# Patient Record
Sex: FEMALE | Race: WHITE | NOT HISPANIC OR LATINO
[De-identification: names, ages, dates, MRNs, and addresses within clinical notes are randomized per-mention and may not be internally consistent; named-entity substitution may affect disease eponyms.]

---

## 2018-05-01 ENCOUNTER — APPOINTMENT (OUTPATIENT)
Dept: MAMMOGRAPHY | Facility: CLINIC | Age: 53
End: 2018-05-01
Payer: COMMERCIAL

## 2018-05-01 ENCOUNTER — APPOINTMENT (OUTPATIENT)
Dept: ULTRASOUND IMAGING | Facility: CLINIC | Age: 53
End: 2018-05-01
Payer: COMMERCIAL

## 2018-05-01 ENCOUNTER — OUTPATIENT (OUTPATIENT)
Dept: OUTPATIENT SERVICES | Facility: HOSPITAL | Age: 53
LOS: 1 days | End: 2018-05-01
Payer: COMMERCIAL

## 2018-05-01 DIAGNOSIS — Z12.31 ENCOUNTER FOR SCREENING MAMMOGRAM FOR MALIGNANT NEOPLASM OF BREAST: ICD-10-CM

## 2018-05-01 PROCEDURE — 77067 SCR MAMMO BI INCL CAD: CPT | Mod: 26

## 2018-05-01 PROCEDURE — 76641 ULTRASOUND BREAST COMPLETE: CPT | Mod: 26,50

## 2018-05-01 PROCEDURE — 77067 SCR MAMMO BI INCL CAD: CPT

## 2018-05-01 PROCEDURE — 77063 BREAST TOMOSYNTHESIS BI: CPT | Mod: 26

## 2018-05-01 PROCEDURE — 77063 BREAST TOMOSYNTHESIS BI: CPT

## 2018-05-01 PROCEDURE — 76641 ULTRASOUND BREAST COMPLETE: CPT

## 2021-12-24 ENCOUNTER — TRANSCRIPTION ENCOUNTER (OUTPATIENT)
Age: 56
End: 2021-12-24

## 2022-01-05 ENCOUNTER — APPOINTMENT (OUTPATIENT)
Dept: VASCULAR SURGERY | Facility: CLINIC | Age: 57
End: 2022-01-05

## 2022-04-22 ENCOUNTER — INPATIENT (INPATIENT)
Facility: HOSPITAL | Age: 57
LOS: 5 days | Discharge: NURSING, REHAB OR LONG-TERM CARE | DRG: 488 | End: 2022-04-28
Attending: STUDENT IN AN ORGANIZED HEALTH CARE EDUCATION/TRAINING PROGRAM | Admitting: STUDENT IN AN ORGANIZED HEALTH CARE EDUCATION/TRAINING PROGRAM
Payer: COMMERCIAL

## 2022-04-22 VITALS
WEIGHT: 199.96 LBS | DIASTOLIC BLOOD PRESSURE: 99 MMHG | RESPIRATION RATE: 22 BRPM | HEART RATE: 98 BPM | TEMPERATURE: 99 F | SYSTOLIC BLOOD PRESSURE: 175 MMHG | OXYGEN SATURATION: 99 %

## 2022-04-22 DIAGNOSIS — S82.209A UNSPECIFIED FRACTURE OF SHAFT OF UNSPECIFIED TIBIA, INITIAL ENCOUNTER FOR CLOSED FRACTURE: ICD-10-CM

## 2022-04-22 DIAGNOSIS — S82.101A UNSPECIFIED FRACTURE OF UPPER END OF RIGHT TIBIA, INITIAL ENCOUNTER FOR CLOSED FRACTURE: ICD-10-CM

## 2022-04-22 LAB
ABO RH CONFIRMATION: SIGNIFICANT CHANGE UP
ALBUMIN SERPL ELPH-MCNC: 4.3 G/DL — SIGNIFICANT CHANGE UP (ref 3.3–5.2)
ALP SERPL-CCNC: 109 U/L — SIGNIFICANT CHANGE UP (ref 40–120)
ALT FLD-CCNC: 42 U/L — HIGH
ANION GAP SERPL CALC-SCNC: 17 MMOL/L — SIGNIFICANT CHANGE UP (ref 5–17)
APTT BLD: 24.8 SEC — LOW (ref 27.5–35.5)
AST SERPL-CCNC: 34 U/L — HIGH
BASOPHILS # BLD AUTO: 0.07 K/UL — SIGNIFICANT CHANGE UP (ref 0–0.2)
BASOPHILS NFR BLD AUTO: 0.5 % — SIGNIFICANT CHANGE UP (ref 0–2)
BILIRUB SERPL-MCNC: 0.3 MG/DL — LOW (ref 0.4–2)
BLD GP AB SCN SERPL QL: SIGNIFICANT CHANGE UP
BUN SERPL-MCNC: 23.3 MG/DL — HIGH (ref 8–20)
CALCIUM SERPL-MCNC: 9.4 MG/DL — SIGNIFICANT CHANGE UP (ref 8.6–10.2)
CHLORIDE SERPL-SCNC: 102 MMOL/L — SIGNIFICANT CHANGE UP (ref 98–107)
CO2 SERPL-SCNC: 21 MMOL/L — LOW (ref 22–29)
CREAT SERPL-MCNC: 0.83 MG/DL — SIGNIFICANT CHANGE UP (ref 0.5–1.3)
EGFR: 82 ML/MIN/1.73M2 — SIGNIFICANT CHANGE UP
EOSINOPHIL # BLD AUTO: 0.21 K/UL — SIGNIFICANT CHANGE UP (ref 0–0.5)
EOSINOPHIL NFR BLD AUTO: 1.6 % — SIGNIFICANT CHANGE UP (ref 0–6)
GLUCOSE SERPL-MCNC: 132 MG/DL — HIGH (ref 70–99)
HCT VFR BLD CALC: 43.8 % — SIGNIFICANT CHANGE UP (ref 34.5–45)
HGB BLD-MCNC: 14.4 G/DL — SIGNIFICANT CHANGE UP (ref 11.5–15.5)
IMM GRANULOCYTES NFR BLD AUTO: 0.4 % — SIGNIFICANT CHANGE UP (ref 0–1.5)
INR BLD: 0.97 RATIO — SIGNIFICANT CHANGE UP (ref 0.88–1.16)
LYMPHOCYTES # BLD AUTO: 18.7 % — SIGNIFICANT CHANGE UP (ref 13–44)
LYMPHOCYTES # BLD AUTO: 2.42 K/UL — SIGNIFICANT CHANGE UP (ref 1–3.3)
MCHC RBC-ENTMCNC: 29 PG — SIGNIFICANT CHANGE UP (ref 27–34)
MCHC RBC-ENTMCNC: 32.9 GM/DL — SIGNIFICANT CHANGE UP (ref 32–36)
MCV RBC AUTO: 88.3 FL — SIGNIFICANT CHANGE UP (ref 80–100)
MONOCYTES # BLD AUTO: 0.56 K/UL — SIGNIFICANT CHANGE UP (ref 0–0.9)
MONOCYTES NFR BLD AUTO: 4.3 % — SIGNIFICANT CHANGE UP (ref 2–14)
NEUTROPHILS # BLD AUTO: 9.66 K/UL — HIGH (ref 1.8–7.4)
NEUTROPHILS NFR BLD AUTO: 74.5 % — SIGNIFICANT CHANGE UP (ref 43–77)
PLATELET # BLD AUTO: 347 K/UL — SIGNIFICANT CHANGE UP (ref 150–400)
POTASSIUM SERPL-MCNC: 3.8 MMOL/L — SIGNIFICANT CHANGE UP (ref 3.5–5.3)
POTASSIUM SERPL-SCNC: 3.8 MMOL/L — SIGNIFICANT CHANGE UP (ref 3.5–5.3)
PROT SERPL-MCNC: 7.7 G/DL — SIGNIFICANT CHANGE UP (ref 6.6–8.7)
PROTHROM AB SERPL-ACNC: 11.2 SEC — SIGNIFICANT CHANGE UP (ref 10.5–13.4)
RAPID RVP RESULT: SIGNIFICANT CHANGE UP
RBC # BLD: 4.96 M/UL — SIGNIFICANT CHANGE UP (ref 3.8–5.2)
RBC # FLD: 13.2 % — SIGNIFICANT CHANGE UP (ref 10.3–14.5)
SARS-COV-2 RNA SPEC QL NAA+PROBE: SIGNIFICANT CHANGE UP
SODIUM SERPL-SCNC: 140 MMOL/L — SIGNIFICANT CHANGE UP (ref 135–145)
WBC # BLD: 12.97 K/UL — HIGH (ref 3.8–10.5)
WBC # FLD AUTO: 12.97 K/UL — HIGH (ref 3.8–10.5)

## 2022-04-22 PROCEDURE — 99223 1ST HOSP IP/OBS HIGH 75: CPT | Mod: 57

## 2022-04-22 PROCEDURE — 73552 X-RAY EXAM OF FEMUR 2/>: CPT | Mod: 26,RT

## 2022-04-22 PROCEDURE — 99285 EMERGENCY DEPT VISIT HI MDM: CPT

## 2022-04-22 PROCEDURE — 73700 CT LOWER EXTREMITY W/O DYE: CPT | Mod: 26,RT

## 2022-04-22 PROCEDURE — 99254 IP/OBS CNSLTJ NEW/EST MOD 60: CPT

## 2022-04-22 PROCEDURE — 71045 X-RAY EXAM CHEST 1 VIEW: CPT | Mod: 26

## 2022-04-22 PROCEDURE — 93010 ELECTROCARDIOGRAM REPORT: CPT

## 2022-04-22 PROCEDURE — 73590 X-RAY EXAM OF LOWER LEG: CPT | Mod: 26,RT

## 2022-04-22 RX ORDER — HYDROMORPHONE HYDROCHLORIDE 2 MG/ML
1 INJECTION INTRAMUSCULAR; INTRAVENOUS; SUBCUTANEOUS ONCE
Refills: 0 | Status: DISCONTINUED | OUTPATIENT
Start: 2022-04-22 | End: 2022-04-22

## 2022-04-22 RX ORDER — KETOROLAC TROMETHAMINE 30 MG/ML
30 SYRINGE (ML) INJECTION ONCE
Refills: 0 | Status: DISCONTINUED | OUTPATIENT
Start: 2022-04-22 | End: 2022-04-22

## 2022-04-22 RX ORDER — ACETAMINOPHEN 500 MG
1000 TABLET ORAL ONCE
Refills: 0 | Status: COMPLETED | OUTPATIENT
Start: 2022-04-22 | End: 2022-04-22

## 2022-04-22 RX ORDER — MUPIROCIN 20 MG/G
1 OINTMENT TOPICAL
Refills: 0 | Status: DISCONTINUED | OUTPATIENT
Start: 2022-04-22 | End: 2022-04-25

## 2022-04-22 RX ORDER — ONDANSETRON 8 MG/1
4 TABLET, FILM COATED ORAL ONCE
Refills: 0 | Status: COMPLETED | OUTPATIENT
Start: 2022-04-22 | End: 2022-04-22

## 2022-04-22 RX ORDER — OXYCODONE HYDROCHLORIDE 5 MG/1
5 TABLET ORAL
Refills: 0 | Status: DISCONTINUED | OUTPATIENT
Start: 2022-04-22 | End: 2022-04-25

## 2022-04-22 RX ORDER — SODIUM CHLORIDE 9 MG/ML
500 INJECTION INTRAMUSCULAR; INTRAVENOUS; SUBCUTANEOUS ONCE
Refills: 0 | Status: COMPLETED | OUTPATIENT
Start: 2022-04-22 | End: 2022-04-22

## 2022-04-22 RX ORDER — ATORVASTATIN CALCIUM 80 MG/1
20 TABLET, FILM COATED ORAL AT BEDTIME
Refills: 0 | Status: DISCONTINUED | OUTPATIENT
Start: 2022-04-22 | End: 2022-04-25

## 2022-04-22 RX ORDER — ONDANSETRON 8 MG/1
4 TABLET, FILM COATED ORAL EVERY 4 HOURS
Refills: 0 | Status: DISCONTINUED | OUTPATIENT
Start: 2022-04-22 | End: 2022-04-25

## 2022-04-22 RX ORDER — HYDROMORPHONE HYDROCHLORIDE 2 MG/ML
0.5 INJECTION INTRAMUSCULAR; INTRAVENOUS; SUBCUTANEOUS EVERY 4 HOURS
Refills: 0 | Status: DISCONTINUED | OUTPATIENT
Start: 2022-04-22 | End: 2022-04-25

## 2022-04-22 RX ORDER — ENOXAPARIN SODIUM 100 MG/ML
40 INJECTION SUBCUTANEOUS ONCE
Refills: 0 | Status: COMPLETED | OUTPATIENT
Start: 2022-04-22 | End: 2022-04-22

## 2022-04-22 RX ORDER — OXYCODONE HYDROCHLORIDE 5 MG/1
10 TABLET ORAL
Refills: 0 | Status: DISCONTINUED | OUTPATIENT
Start: 2022-04-22 | End: 2022-04-25

## 2022-04-22 RX ORDER — POVIDONE-IODINE 5 %
1 AEROSOL (ML) TOPICAL ONCE
Refills: 0 | Status: COMPLETED | OUTPATIENT
Start: 2022-04-22 | End: 2022-04-25

## 2022-04-22 RX ORDER — PANTOPRAZOLE SODIUM 20 MG/1
40 TABLET, DELAYED RELEASE ORAL
Refills: 0 | Status: DISCONTINUED | OUTPATIENT
Start: 2022-04-22 | End: 2022-04-25

## 2022-04-22 RX ORDER — FLUTICASONE PROPIONATE 50 MCG
1 SPRAY, SUSPENSION NASAL
Refills: 0 | Status: DISCONTINUED | OUTPATIENT
Start: 2022-04-22 | End: 2022-04-25

## 2022-04-22 RX ORDER — HYDROMORPHONE HYDROCHLORIDE 2 MG/ML
0.5 INJECTION INTRAMUSCULAR; INTRAVENOUS; SUBCUTANEOUS ONCE
Refills: 0 | Status: DISCONTINUED | OUTPATIENT
Start: 2022-04-22 | End: 2022-04-22

## 2022-04-22 RX ADMIN — Medication 1000 MILLIGRAM(S): at 11:55

## 2022-04-22 RX ADMIN — HYDROMORPHONE HYDROCHLORIDE 0.5 MILLIGRAM(S): 2 INJECTION INTRAMUSCULAR; INTRAVENOUS; SUBCUTANEOUS at 10:26

## 2022-04-22 RX ADMIN — Medication 400 MILLIGRAM(S): at 10:27

## 2022-04-22 RX ADMIN — HYDROMORPHONE HYDROCHLORIDE 0.5 MILLIGRAM(S): 2 INJECTION INTRAMUSCULAR; INTRAVENOUS; SUBCUTANEOUS at 21:22

## 2022-04-22 RX ADMIN — OXYCODONE HYDROCHLORIDE 10 MILLIGRAM(S): 5 TABLET ORAL at 22:39

## 2022-04-22 RX ADMIN — OXYCODONE HYDROCHLORIDE 10 MILLIGRAM(S): 5 TABLET ORAL at 20:30

## 2022-04-22 RX ADMIN — HYDROMORPHONE HYDROCHLORIDE 1 MILLIGRAM(S): 2 INJECTION INTRAMUSCULAR; INTRAVENOUS; SUBCUTANEOUS at 10:27

## 2022-04-22 RX ADMIN — ATORVASTATIN CALCIUM 20 MILLIGRAM(S): 80 TABLET, FILM COATED ORAL at 21:08

## 2022-04-22 RX ADMIN — ENOXAPARIN SODIUM 40 MILLIGRAM(S): 100 INJECTION SUBCUTANEOUS at 15:30

## 2022-04-22 RX ADMIN — HYDROMORPHONE HYDROCHLORIDE 0.5 MILLIGRAM(S): 2 INJECTION INTRAMUSCULAR; INTRAVENOUS; SUBCUTANEOUS at 17:00

## 2022-04-22 RX ADMIN — ONDANSETRON 4 MILLIGRAM(S): 8 TABLET, FILM COATED ORAL at 10:13

## 2022-04-22 RX ADMIN — HYDROMORPHONE HYDROCHLORIDE 0.5 MILLIGRAM(S): 2 INJECTION INTRAMUSCULAR; INTRAVENOUS; SUBCUTANEOUS at 12:10

## 2022-04-22 RX ADMIN — HYDROMORPHONE HYDROCHLORIDE 0.5 MILLIGRAM(S): 2 INJECTION INTRAMUSCULAR; INTRAVENOUS; SUBCUTANEOUS at 21:07

## 2022-04-22 RX ADMIN — HYDROMORPHONE HYDROCHLORIDE 0.5 MILLIGRAM(S): 2 INJECTION INTRAMUSCULAR; INTRAVENOUS; SUBCUTANEOUS at 16:14

## 2022-04-22 RX ADMIN — OXYCODONE HYDROCHLORIDE 10 MILLIGRAM(S): 5 TABLET ORAL at 19:33

## 2022-04-22 RX ADMIN — HYDROMORPHONE HYDROCHLORIDE 0.5 MILLIGRAM(S): 2 INJECTION INTRAMUSCULAR; INTRAVENOUS; SUBCUTANEOUS at 10:13

## 2022-04-22 RX ADMIN — OXYCODONE HYDROCHLORIDE 10 MILLIGRAM(S): 5 TABLET ORAL at 23:17

## 2022-04-22 RX ADMIN — HYDROMORPHONE HYDROCHLORIDE 1 MILLIGRAM(S): 2 INJECTION INTRAMUSCULAR; INTRAVENOUS; SUBCUTANEOUS at 11:55

## 2022-04-22 RX ADMIN — HYDROMORPHONE HYDROCHLORIDE 0.5 MILLIGRAM(S): 2 INJECTION INTRAMUSCULAR; INTRAVENOUS; SUBCUTANEOUS at 12:23

## 2022-04-22 RX ADMIN — SODIUM CHLORIDE 1000 MILLILITER(S): 9 INJECTION INTRAMUSCULAR; INTRAVENOUS; SUBCUTANEOUS at 15:30

## 2022-04-22 RX ADMIN — MUPIROCIN 1 APPLICATION(S): 20 OINTMENT TOPICAL at 16:07

## 2022-04-22 RX ADMIN — Medication 30 MILLIGRAM(S): at 10:27

## 2022-04-22 RX ADMIN — ONDANSETRON 4 MILLIGRAM(S): 8 TABLET, FILM COATED ORAL at 16:15

## 2022-04-22 RX ADMIN — Medication 30 MILLIGRAM(S): at 11:55

## 2022-04-22 NOTE — CONSULT NOTE ADULT - SUBJECTIVE AND OBJECTIVE BOX
Patient is a 56y old  Female who presented to ED with RLE pain after mechanical fall , found to have R proximal tibia  fracture , planned for repair . Medicine consulted for preop clearance .   Patient seen and examined in ED , pain better controlled ,feels nauseated after pain medications ,   no sob , no chest pain  no other complaints     CC: R knee pain , s/p mechanical fall     Patient is a 56y Female with PMH of HLD, Acid Reflux , history of obesity , s/p Lap band 2009 ,   complicated with aspiration PNA and lung abscess in 2020 , was hospitalized and treated with iv Abx x 6 wks ( s/p PICC line placement ) , follows regularly with Pulmonologist, hx of small lungs nodules  . Last CT chest done 3/30 /22 - read as new small reticular infiltrates in the R middle lobe, minimally enlarged R lung nodules as well new small nodules < 5 mm. Patient presented presenting to the emergency department with a chief complaint of RLE pain x today, s/p mechanical fall. Patient states she tripped and fell off her porch this AM. Denies numbness/tingling. No LOC , admits to chronic sob  on exertion but no chest pain .                             14.4   12.97 )-----------( 347      ( 22 Apr 2022 10:22 )             43.8     04-22    140  |  102  |  23.3<H>  ----------------------------<  132<H>  3.8   |  21.0<L>  |  0.83    Ca    9.4      22 Apr 2022 10:22    TPro  7.7  /  Alb  4.3  /  TBili  0.3<L>  /  DBili  x   /  AST  34<H>  /  ALT  42<H>  /  AlkPhos  109  04-22    Respiratory Viral Panel with COVID-19 by SELINA (04.22.22 @ 10:22)    Rapid RVP Result: NotDetec      PAST MEDICAL & SURGICAL HISTORY:  HLD   Acid Reflux  Hx of obesity , s/p lap band ( 2009 )   Aspiration PNA / lung abscess -   s/p PICC line and iv ABX X 6wks in 2020 ,   hx of small lung nodules < 5 mm      Social History:  Tobacco - ex smoker , quit 2003   ETOH - denies   Illicit drug abuse - denies    FAMILY HISTORY:  Brother with CAD/ CABG   Mother with CHF  Denies CA       Allergies    No Known Allergies    Intolerances    HOME MEDICATIONS :     Atorvastatin 20 mg QHS  Pantoprazole     REVIEW OF SYSTEMS:    R knee pain , nausea , s/p mechanical fall ,   chronic sob post aspiration PNA / abscess on exertion ,   all other systems are reviewed and are negative     MEDICATIONS  (STANDING):  atorvastatin 20 milliGRAM(s) Oral at bedtime  fluticasone propionate 50 MICROgram(s)/spray Nasal Spray 1 Spray(s) Both Nostrils two times a day  mupirocin 2% Ointment 1 Application(s) Both Nostrils two times a day  pantoprazole    Tablet 40 milliGRAM(s) Oral before breakfast  povidone iodine 5% Nasal Swab 1 Application(s) Both Nostrils once    MEDICATIONS  (PRN):  HYDROmorphone  Injectable 0.5 milliGRAM(s) IV Push every 4 hours PRN breakthrough pain  oxyCODONE    IR 5 milliGRAM(s) Oral every 3 hours PRN Mild Pain (1 - 3)  oxyCODONE    IR 10 milliGRAM(s) Oral every 3 hours PRN Moderate Pain (4 - 6)      Vital Signs Last 24 Hrs  T(C): 36.6 (22 Apr 2022 10:36), Max: 37.1 (22 Apr 2022 09:33)  T(F): 97.9 (22 Apr 2022 10:36), Max: 98.8 (22 Apr 2022 09:33)  HR: 64 (22 Apr 2022 10:36) (64 - 98)  BP: 117/79 (22 Apr 2022 10:36) (117/79 - 175/99)  BP(mean): --  RR: 22 (22 Apr 2022 10:36) (22 - 22)  SpO2: 98% (22 Apr 2022 10:36) (98% - 99%)    PHYSICAL EXAM:    GENERAL: NAD, well-groomed, well-developed  HEAD:  Atraumatic, Normocephalic  EYES: EOMI, PERRLA, conjunctiva and sclera clear  NECK: Supple, No JVD, Normal thyroid  NERVOUS SYSTEM:  Alert & Oriented X4 , no focal deficit   CHEST/LUNG: CTA  b/l,  no rales, rhonchi, wheezing, or rubs  HEART: Regular rate and rhythm; No murmurs, rubs, or gallops  ABDOMEN: Soft, Nontender, Nondistended; Bowel sounds present  EXTREMITIES:  2+ Peripheral Pulses, No clubbing, cyanosis, or edema ,   R knee dressing + , tenderness +   LYMPH: No lymphadenopathy noted  SKIN: No rashes or lesions    LABS:                        14.4   12.97 )-----------( 347      ( 22 Apr 2022 10:22 )             43.8     04-22    140  |  102  |  23.3<H>  ----------------------------<  132<H>  3.8   |  21.0<L>  |  0.83    Ca    9.4      22 Apr 2022 10:22    TPro  7.7  /  Alb  4.3  /  TBili  0.3<L>  /  DBili  x   /  AST  34<H>  /  ALT  42<H>  /  AlkPhos  109  04-22    PT/INR - ( 22 Apr 2022 10:22 )   PT: 11.2 sec;   INR: 0.97 ratio         PTT - ( 22 Apr 2022 10:22 )  PTT:24.8 sec        RADIOLOGY & ADDITIONAL STUDIES:    < from: Xray Chest 1 View- PORTABLE-Urgent (Xray Chest 1 View- PORTABLE-Urgent .) (04.22.22 @ 10:50) >    ACC: [id number] EXAM:  XR TIB FIB AP LAT 2 VIEWS RT                        ACC: [id number] EXAM:  XR CHEST PORTABLE URGENT 1V                          PROCEDURE DATE:  04/22/2022          INTERPRETATION:  Chest and right tib-fib. Patient fell with local trauma.    AP chest on April 22, 2022 at 10:32 AM.    COMPARISON: None available.    Heart size is within normal limits.    Lungs are clear.    No chest fracture.    Right tib-fib. AP and lateral views.    There is a comminuted fracture the upper shaft that extends into the   lateral tibial plateau where there is a mild depression.    There is a knee effusion. The knee is a baseline is relatively free of   degeneration.    IMPRESSION: Comminuted upper tibial fracture extending to the lateral   tibial plateau with mild depression.    --- End of Report ---    < end of copied text >    < from: Xray Femur 2 Views, Right (04.22.22 @ 10:52) >    ACC: [id number] EXAM:  XR FEMUR 2 VIEWS RT                          PROCEDURE DATE:  04/22/2022          INTERPRETATION:  Right femur. 2 views. Patient fell with local trauma.    Extensive the comminuted upper shaft fracture of the tibia with extension   to the lateral tibial plateau again noted.    The femur proper is unremarkable in the right hip is unremarkable.    IMPRESSION: As above.    --- End of Report ---    < end of copied text >      EKG ( 4/22/22 ) - NSR - bradycardia at 54 bpm

## 2022-04-22 NOTE — ED PROVIDER NOTE - MUSCULOSKELETAL, MLM
Spine appears normal, range of motion is not limited. No C/T/L spine TTP. + TTP of the right proximal tibia TTP. No TTP of the right hip/femur/ankle/foot noted. No signs of trauma or TTP with FROM of all other extremities.

## 2022-04-22 NOTE — ED ADULT NURSE NOTE - OBJECTIVE STATEMENT
pt arrives to ED after a fall off of her back porch having her leg twist the other way. Pt is severe pain and leg is immobilized with a splint. IV placed, labs sent, meds given, XRay being completed.

## 2022-04-22 NOTE — ED PROVIDER NOTE - NS ED ATTENDING STATEMENT MOD
This was a shared visit with the AUGUST. I reviewed and verified the documentation and independently performed the documented:

## 2022-04-22 NOTE — H&P ADULT - PROBLEM SELECTOR PLAN 1
D/W Dr. Bowers  f/u CT  plan for OR this weekend vs Monday pending swelling  strict elevation  compartment checks q4h D/W Dr. Bowers  f/u CT  well padded KI placed  plan for OR this weekend vs Monday pending swelling  strict elevation  compartment checks q4h

## 2022-04-22 NOTE — PATIENT PROFILE ADULT - FALL HARM RISK - HARM RISK INTERVENTIONS

## 2022-04-22 NOTE — PATIENT PROFILE ADULT - FALL HARM RISK - TYPE OF ASSESSMENT
Admission
[email or web address]

## 2022-04-22 NOTE — CONSULT NOTE ADULT - ASSESSMENT
Patient is a 56y Female with PMH of HLD, Acid Reflux , history of obesity , s/p Lap band 2009 ,   complicated with aspiration PNA and lung abscess in 2020 , was hospitalized and treated with iv Abx x 6 wks ( s/p PICC line placement ) , follows regularly with Pulmonologist, hx of small lungs nodules  . Last CT chest done 3/30 /22 - read as new small reticular infiltrates in the R middle lobe, minimally enlarged R lung nodules as well new small nodules < 5 mm. Patient presented presenting to the emergency department with a chief complaint of RLE pain x today, s/p mechanical fall. Patient states she tripped and fell off her porch this AM. Denies numbness/tingling. No LOC , admits to chronic sob  on exertion but no chest pain .     1. R proximal tibia fracture post mechanical fall , no LOC -   planned for repair by ortho   - pain mgmt   - post op dvt prophylaxis/ PT/OT    2.Hx of HLD - continue home dose of statins     3. Hx of Acid Reflux - continue PPI    4. Hx of small lung nodules/ chronic sob / hx of Aspiration PNA / abccess   in 2020 - treated , follows with Pulmonologist ,presently no sob , O2 sat on RA 98% ,   CXR - no acute pathology     5. Leucocytosis - no S/S of infection - likely reactive due to fracture     6. BP elevated initially without hx of HTN - likely due to pain ,   repeated well controlled , may use Hydralazine ivp 10 MG prn Q6 hrs for SBP > 170     7. DVT prophylaxis  - as per ortho protocol  Opioid induced constipation  prophylaxis - start  bowel regimen postop     8. Nausea - after pain meds / opioids - will add Zofran - observe     Thank you for the courtesy of this consult .     Labs / EKG / CXR - reviewed , patient has no Hx of CAD/CHF ,   hx of chronic sob on exertion after lung abscess, no chest pain .    Medically  optimized for planned procedure  .   Will follow along with you .

## 2022-04-22 NOTE — PROGRESS NOTE ADULT - SUBJECTIVE AND OBJECTIVE BOX
patient seen and examined at bedside. pain better controlled. no complaints.    RLE: Well padded KI in place. compartments soft, compressible throughout. N/V in tact distally    cont q4h compartment checks

## 2022-04-22 NOTE — PROGRESS NOTE ADULT - SUBJECTIVE AND OBJECTIVE BOX
Patient was seen and examined at 7:00pm. Patient's Right lower extremity  is elevated, ice noted, in a Knee immobilizer. Compartments soft, compressible and non-tender to palpation. denies acute changes.

## 2022-04-22 NOTE — ED ADULT TRIAGE NOTE - CHIEF COMPLAINT QUOTE
mechanical fall down 4 steps  pain and deformity to R lower leg, splint in place. denies LOC denies hitting head, pulse present able to move toes

## 2022-04-22 NOTE — H&P ADULT - HISTORY OF PRESENT ILLNESS
Pt Name: EARNESTINE BERNSTEIN    MRN: [id number]      Patient is a 56y Female presenting to the emergency department with a chief complaint of RLE pain x today, s/p mechanical fall. Patient states she tripped and fell off her porch this AM. Denies numbness/tingling. No other orthopedic complaints    REVIEW OF SYSTEMS      General:	denies fever/chills  	  Respiratory and Thorax: denies SOB  	  Cardiovascular:	denies CP    Musculoskeletal:	 see HPI    Neurological:	denies numbness/tingling    ROS is otherwise negative.    PAST MEDICAL & SURGICAL HISTORY:  PAST MEDICAL & SURGICAL HISTORY:      Allergies: No Known Allergies      Medications:     FAMILY HISTORY:  : non-contributory    Social History:     Ambulation: Walking independently [X ] With Cane [ ] With Walker [ ]  Bedbound [ ]                           14.4   12.97 )-----------( 347      ( 22 Apr 2022 10:22 )             43.8     04-22    140  |  102  |  23.3<H>  ----------------------------<  132<H>  3.8   |  21.0<L>  |  0.83    Ca    9.4      22 Apr 2022 10:22    TPro  7.7  /  Alb  4.3  /  TBili  0.3<L>  /  DBili  x   /  AST  34<H>  /  ALT  42<H>  /  AlkPhos  109  04-22      PHYSICAL EXAM:    Vital Signs Last 24 Hrs  T(C): 37.1 (22 Apr 2022 09:33), Max: 37.1 (22 Apr 2022 09:33)  T(F): 98.8 (22 Apr 2022 09:33), Max: 98.8 (22 Apr 2022 09:33)  HR: 98 (22 Apr 2022 09:33) (98 - 98)  BP: 175/99 (22 Apr 2022 09:33) (175/99 - 175/99)  BP(mean): --  RR: 22 (22 Apr 2022 09:33) (22 - 22)  SpO2: 99% (22 Apr 2022 09:33) (99% - 99%)  Daily     Daily     Appearance: Alert, responsive, in no acute distress.    Neurological: Sensation is grossly intact to light touch.    Skin: no rash on visible skin. Skin is clean, dry and intact. No bleeding. No abrasions. No ulcerations.    Vascular: 2+ distal pulses. Cap refill < 2 sec. No signs of venous insuffiency or stasis. No extremity ulcerations. No cyanosis.    Musculoskeletal:         Left Upper Extremity: can move freely without pain       Right Upper Extremity: can move freely without pain       Left Lower Extremity: can move freely without pain       Right Lower Extremity: + swelling and ecchymoses to prox tibia. skin in tact. SILT. + dorsi/plantarflexion, limited secondary to pain. + EHL/FHL. DP 2+. calf soft NT B/L. compartments soft, supple throughout    Imaging Studies:  xray right knee: + prox tib fx    A/P:  Pt is a  56y Female with right prox tib fx    PLAN:   D/W Dr. Bowers  f/u CT  compartment checks q4h  plan for OR this weekend vs Monday pending swelling  admit to ortho  pain control Pt Name: EARNESTINE BERNSTEIN    MRN: [id number]      Patient is a 56y Female presenting to the emergency department with a chief complaint of RLE pain x today, s/p mechanical fall. Patient states she tripped and fell off her porch this AM. Denies numbness/tingling. No other orthopedic complaints    REVIEW OF SYSTEMS      General:	denies fever/chills  	  Respiratory and Thorax: denies SOB  	  Cardiovascular:	denies CP    Musculoskeletal:	 see HPI    Neurological:	denies numbness/tingling    ROS is otherwise negative.    PAST MEDICAL & SURGICAL HISTORY:  PAST MEDICAL & SURGICAL HISTORY:      Allergies: No Known Allergies      Medications:     FAMILY HISTORY:  : non-contributory    Social History:     Ambulation: Walking independently [X ] With Cane [ ] With Walker [ ]  Bedbound [ ]                           14.4   12.97 )-----------( 347      ( 22 Apr 2022 10:22 )             43.8     04-22    140  |  102  |  23.3<H>  ----------------------------<  132<H>  3.8   |  21.0<L>  |  0.83    Ca    9.4      22 Apr 2022 10:22    TPro  7.7  /  Alb  4.3  /  TBili  0.3<L>  /  DBili  x   /  AST  34<H>  /  ALT  42<H>  /  AlkPhos  109  04-22      PHYSICAL EXAM:    Vital Signs Last 24 Hrs  T(C): 37.1 (22 Apr 2022 09:33), Max: 37.1 (22 Apr 2022 09:33)  T(F): 98.8 (22 Apr 2022 09:33), Max: 98.8 (22 Apr 2022 09:33)  HR: 98 (22 Apr 2022 09:33) (98 - 98)  BP: 175/99 (22 Apr 2022 09:33) (175/99 - 175/99)  BP(mean): --  RR: 22 (22 Apr 2022 09:33) (22 - 22)  SpO2: 99% (22 Apr 2022 09:33) (99% - 99%)  Daily     Daily     Appearance: Alert, responsive, in no acute distress.    Neurological: Sensation is grossly intact to light touch.    Skin: no rash on visible skin. Skin is clean, dry and intact. No bleeding. No abrasions. No ulcerations.    Vascular: 2+ distal pulses DP, PT. Cap refill < 2 sec. No signs of venous insuffiency or stasis. No extremity ulcerations. No cyanosis.    Musculoskeletal:         Left Upper Extremity: can move freely without pain       Right Upper Extremity: can move freely without pain       Left Lower Extremity: can move freely without pain       Right Lower Extremity: + swelling and ecchymoses to prox tibia. skin in tact. SILT. + dorsi/plantarflexion, limited secondary to pain. + EHL/FHL. DP 2+. calf soft NT B/L. compartments soft, supple throughout    Imaging Studies:  xray right knee: + prox tib fx    A/P:  Pt is a  56y Female with right prox tib fx    PLAN:   D/W Dr. Bowers  f/u CT  compartment checks q4h  plan for OR this weekend vs Monday pending swelling  admit to ortho  pain control

## 2022-04-22 NOTE — H&P ADULT - NS ATTEND AMEND GEN_ALL_CORE FT
pt seen and examined. s/p mec fall. +pain to R knee. XR shows schatzger 6. DP 2+, PT 2+, foot warm, well perfused, bcr. Hip no pain /w ROM, NVID. Secondary survey negative. Placed in KI. F/u CT R knee. Ice and elevation. D/w pt she may need ex fix if swelling worsens, righgt now fx is stable and well aligned in KI. D/w pt posisble need for fasciotomies if comp syndrome develops.   NWB RLE  pain control  strict elevtion  DVT ppx  plan for OR in coming days after soft tissue rest

## 2022-04-22 NOTE — ED PROVIDER NOTE - OBJECTIVE STATEMENT
56F h/o HLD presents to the ED c/o right distal LE pain s/p fall today. Pt states that she fell off her stoop in her backyard and landed on her right side. Pt denies hitting her head, LOC, neck/back pain, c/p, sob, abd pain, n/v/c/d, numbness/tingling/weakness, dizziness, visual changes and has no other complaints at this time.

## 2022-04-22 NOTE — ED PROVIDER NOTE - NEUROLOGICAL, MLM
Alert and oriented, no focal deficits, no motor or sensory deficits. 5/5 strength in UE/LE b/l. CN II-XII intact.

## 2022-04-22 NOTE — ED PROVIDER NOTE - PROGRESS NOTE DETAILS
Pt right right proximal tibia/tibial plateau fx. Ortho consulted. Pt with right proximal tibia/tibial plateau fx. Ortho consulted.

## 2022-04-23 PROCEDURE — 99231 SBSQ HOSP IP/OBS SF/LOW 25: CPT

## 2022-04-23 PROCEDURE — 99232 SBSQ HOSP IP/OBS MODERATE 35: CPT

## 2022-04-23 RX ORDER — ENOXAPARIN SODIUM 100 MG/ML
40 INJECTION SUBCUTANEOUS EVERY 24 HOURS
Refills: 0 | Status: DISCONTINUED | OUTPATIENT
Start: 2022-04-23 | End: 2022-04-23

## 2022-04-23 RX ORDER — ENOXAPARIN SODIUM 100 MG/ML
40 INJECTION SUBCUTANEOUS EVERY 24 HOURS
Refills: 0 | Status: DISCONTINUED | OUTPATIENT
Start: 2022-04-23 | End: 2022-04-24

## 2022-04-23 RX ORDER — ACETAMINOPHEN 500 MG
975 TABLET ORAL EVERY 8 HOURS
Refills: 0 | Status: DISCONTINUED | OUTPATIENT
Start: 2022-04-23 | End: 2022-04-25

## 2022-04-23 RX ADMIN — OXYCODONE HYDROCHLORIDE 10 MILLIGRAM(S): 5 TABLET ORAL at 07:52

## 2022-04-23 RX ADMIN — HYDROMORPHONE HYDROCHLORIDE 0.5 MILLIGRAM(S): 2 INJECTION INTRAMUSCULAR; INTRAVENOUS; SUBCUTANEOUS at 23:00

## 2022-04-23 RX ADMIN — OXYCODONE HYDROCHLORIDE 10 MILLIGRAM(S): 5 TABLET ORAL at 08:30

## 2022-04-23 RX ADMIN — OXYCODONE HYDROCHLORIDE 10 MILLIGRAM(S): 5 TABLET ORAL at 15:28

## 2022-04-23 RX ADMIN — ATORVASTATIN CALCIUM 20 MILLIGRAM(S): 80 TABLET, FILM COATED ORAL at 22:17

## 2022-04-23 RX ADMIN — HYDROMORPHONE HYDROCHLORIDE 0.5 MILLIGRAM(S): 2 INJECTION INTRAMUSCULAR; INTRAVENOUS; SUBCUTANEOUS at 04:05

## 2022-04-23 RX ADMIN — ONDANSETRON 4 MILLIGRAM(S): 8 TABLET, FILM COATED ORAL at 11:50

## 2022-04-23 RX ADMIN — OXYCODONE HYDROCHLORIDE 10 MILLIGRAM(S): 5 TABLET ORAL at 01:53

## 2022-04-23 RX ADMIN — OXYCODONE HYDROCHLORIDE 10 MILLIGRAM(S): 5 TABLET ORAL at 20:23

## 2022-04-23 RX ADMIN — Medication 975 MILLIGRAM(S): at 22:45

## 2022-04-23 RX ADMIN — HYDROMORPHONE HYDROCHLORIDE 0.5 MILLIGRAM(S): 2 INJECTION INTRAMUSCULAR; INTRAVENOUS; SUBCUTANEOUS at 14:25

## 2022-04-23 RX ADMIN — OXYCODONE HYDROCHLORIDE 10 MILLIGRAM(S): 5 TABLET ORAL at 16:30

## 2022-04-23 RX ADMIN — Medication 1 SPRAY(S): at 05:47

## 2022-04-23 RX ADMIN — OXYCODONE HYDROCHLORIDE 10 MILLIGRAM(S): 5 TABLET ORAL at 02:30

## 2022-04-23 RX ADMIN — PANTOPRAZOLE SODIUM 40 MILLIGRAM(S): 20 TABLET, DELAYED RELEASE ORAL at 05:49

## 2022-04-23 RX ADMIN — Medication 975 MILLIGRAM(S): at 11:49

## 2022-04-23 RX ADMIN — HYDROMORPHONE HYDROCHLORIDE 0.5 MILLIGRAM(S): 2 INJECTION INTRAMUSCULAR; INTRAVENOUS; SUBCUTANEOUS at 18:28

## 2022-04-23 RX ADMIN — HYDROMORPHONE HYDROCHLORIDE 0.5 MILLIGRAM(S): 2 INJECTION INTRAMUSCULAR; INTRAVENOUS; SUBCUTANEOUS at 08:56

## 2022-04-23 RX ADMIN — HYDROMORPHONE HYDROCHLORIDE 0.5 MILLIGRAM(S): 2 INJECTION INTRAMUSCULAR; INTRAVENOUS; SUBCUTANEOUS at 22:16

## 2022-04-23 RX ADMIN — MUPIROCIN 1 APPLICATION(S): 20 OINTMENT TOPICAL at 05:48

## 2022-04-23 RX ADMIN — HYDROMORPHONE HYDROCHLORIDE 0.5 MILLIGRAM(S): 2 INJECTION INTRAMUSCULAR; INTRAVENOUS; SUBCUTANEOUS at 13:24

## 2022-04-23 RX ADMIN — Medication 975 MILLIGRAM(S): at 13:01

## 2022-04-23 RX ADMIN — HYDROMORPHONE HYDROCHLORIDE 0.5 MILLIGRAM(S): 2 INJECTION INTRAMUSCULAR; INTRAVENOUS; SUBCUTANEOUS at 17:54

## 2022-04-23 RX ADMIN — HYDROMORPHONE HYDROCHLORIDE 0.5 MILLIGRAM(S): 2 INJECTION INTRAMUSCULAR; INTRAVENOUS; SUBCUTANEOUS at 04:20

## 2022-04-23 RX ADMIN — OXYCODONE HYDROCHLORIDE 10 MILLIGRAM(S): 5 TABLET ORAL at 12:40

## 2022-04-23 RX ADMIN — ENOXAPARIN SODIUM 40 MILLIGRAM(S): 100 INJECTION SUBCUTANEOUS at 05:47

## 2022-04-23 RX ADMIN — OXYCODONE HYDROCHLORIDE 10 MILLIGRAM(S): 5 TABLET ORAL at 11:49

## 2022-04-23 RX ADMIN — ONDANSETRON 4 MILLIGRAM(S): 8 TABLET, FILM COATED ORAL at 20:54

## 2022-04-23 RX ADMIN — Medication 975 MILLIGRAM(S): at 22:16

## 2022-04-23 RX ADMIN — OXYCODONE HYDROCHLORIDE 10 MILLIGRAM(S): 5 TABLET ORAL at 20:45

## 2022-04-23 RX ADMIN — HYDROMORPHONE HYDROCHLORIDE 0.5 MILLIGRAM(S): 2 INJECTION INTRAMUSCULAR; INTRAVENOUS; SUBCUTANEOUS at 09:25

## 2022-04-23 RX ADMIN — MUPIROCIN 1 APPLICATION(S): 20 OINTMENT TOPICAL at 17:54

## 2022-04-23 NOTE — PROGRESS NOTE ADULT - NS ATTEND AMEND GEN_ALL_CORE FT
pt seen and examined. compartments remain soft, pain well controlled, NVID. continue strict elevation and ice. plan for OR monday.

## 2022-04-23 NOTE — PROGRESS NOTE ADULT - SUBJECTIVE AND OBJECTIVE BOX
Pt Name: EARNESTINE BERNSTEIN    MRN: [id number]      Patient is a being followed for right tibial plateau/ prox tibial fracture  Patient seen and examined at bedside for compartment checks.  Pt states is doing well with pain controlled with pain management  Pt denies any numbness, tingling,      PAST MEDICAL & SURGICAL HISTORY:  PAST MEDICAL & SURGICAL HISTORY:      Allergies: No Known Allergies      Medications: acetaminophen     Tablet .. 975 milliGRAM(s) Oral every 8 hours  atorvastatin 20 milliGRAM(s) Oral at bedtime  enoxaparin Injectable 40 milliGRAM(s) SubCutaneous every 24 hours  fluticasone propionate 50 MICROgram(s)/spray Nasal Spray 1 Spray(s) Both Nostrils two times a day  HYDROmorphone  Injectable 0.5 milliGRAM(s) IV Push every 4 hours PRN  mupirocin 2% Ointment 1 Application(s) Both Nostrils two times a day  ondansetron Injectable 4 milliGRAM(s) IV Push every 4 hours PRN  oxyCODONE    IR 5 milliGRAM(s) Oral every 3 hours PRN  oxyCODONE    IR 10 milliGRAM(s) Oral every 3 hours PRN  pantoprazole    Tablet 40 milliGRAM(s) Oral before breakfast  povidone iodine 5% Nasal Swab 1 Application(s) Both Nostrils once                            14.4   12.97 )-----------( 347      ( 22 Apr 2022 10:22 )             43.8     04-22    140  |  102  |  23.3<H>  ----------------------------<  132<H>  3.8   |  21.0<L>  |  0.83    Ca    9.4      22 Apr 2022 10:22    TPro  7.7  /  Alb  4.3  /  TBili  0.3<L>  /  DBili  x   /  AST  34<H>  /  ALT  42<H>  /  AlkPhos  109  04-22      PHYSICAL EXAM:    Vital Signs Last 24 Hrs  T(C): 36.7 (23 Apr 2022 17:11), Max: 36.8 (23 Apr 2022 04:15)  T(F): 98.1 (23 Apr 2022 17:11), Max: 98.2 (23 Apr 2022 04:15)  HR: 61 (23 Apr 2022 17:11) (60 - 78)  BP: 137/63 (23 Apr 2022 17:11) (128/67 - 153/84)  BP(mean): --  RR: 18 (23 Apr 2022 17:11) (17 - 18)  SpO2: 95% (23 Apr 2022 17:11) (95% - 97%)  Daily     Daily     Appearance: Alert, responsive, in no acute distress.    Musculoskeletal:            Right Lower Extremity: Dressing c/d/i                                       KI intact                                      Compartments soft, compressible and NT                                      +DF/PF/EHL/FHL                                       2+ DP pulse                                       +SILT                                       Neurovascularly intact distally                             A/P:  Pt is a  56y Female with right prox tib/tibial plateau fracture    PLAN:   *  Pt Name: EARNESTINE BERNSTEIN    MRN: [id number]      Patient is a being followed for right tibial plateau/ prox tibial fracture  Patient seen and examined at bedside for compartment checks.  Pt states is doing well with pain controlled with pain management  Pt denies any numbness, tingling,      PAST MEDICAL & SURGICAL HISTORY:  PAST MEDICAL & SURGICAL HISTORY:      Allergies: No Known Allergies      Medications: acetaminophen     Tablet .. 975 milliGRAM(s) Oral every 8 hours  atorvastatin 20 milliGRAM(s) Oral at bedtime  enoxaparin Injectable 40 milliGRAM(s) SubCutaneous every 24 hours  fluticasone propionate 50 MICROgram(s)/spray Nasal Spray 1 Spray(s) Both Nostrils two times a day  HYDROmorphone  Injectable 0.5 milliGRAM(s) IV Push every 4 hours PRN  mupirocin 2% Ointment 1 Application(s) Both Nostrils two times a day  ondansetron Injectable 4 milliGRAM(s) IV Push every 4 hours PRN  oxyCODONE    IR 5 milliGRAM(s) Oral every 3 hours PRN  oxyCODONE    IR 10 milliGRAM(s) Oral every 3 hours PRN  pantoprazole    Tablet 40 milliGRAM(s) Oral before breakfast  povidone iodine 5% Nasal Swab 1 Application(s) Both Nostrils once                            14.4   12.97 )-----------( 347      ( 22 Apr 2022 10:22 )             43.8     04-22    140  |  102  |  23.3<H>  ----------------------------<  132<H>  3.8   |  21.0<L>  |  0.83    Ca    9.4      22 Apr 2022 10:22    TPro  7.7  /  Alb  4.3  /  TBili  0.3<L>  /  DBili  x   /  AST  34<H>  /  ALT  42<H>  /  AlkPhos  109  04-22      PHYSICAL EXAM:    Vital Signs Last 24 Hrs  T(C): 36.7 (23 Apr 2022 17:11), Max: 36.8 (23 Apr 2022 04:15)  T(F): 98.1 (23 Apr 2022 17:11), Max: 98.2 (23 Apr 2022 04:15)  HR: 61 (23 Apr 2022 17:11) (60 - 78)  BP: 137/63 (23 Apr 2022 17:11) (128/67 - 153/84)  BP(mean): --  RR: 18 (23 Apr 2022 17:11) (17 - 18)  SpO2: 95% (23 Apr 2022 17:11) (95% - 97%)  Daily     Daily     Appearance: Alert, responsive, in no acute distress.    Musculoskeletal:            Right Lower Extremity: Elevated with pillows                                       Dressing c/d/i                                       KI intact                                       Compartments soft, compressible and NT                                      +DF/PF/EHL/FHL                                       2+ DP pulse                                       +SILT                                       Neurovascularly intact distally                             A/P:  Pt is a  56y Female with right prox tib/tibial plateau fracture    PLAN:   Pain control prn  Q8 compartment checks   Continue strict elevation RLE & ice  Maintain knee immobilizer  NWB RLE  DVTP Lov 40mg Qd  plan for OR Monday with Dr. Gamez

## 2022-04-23 NOTE — PROGRESS NOTE ADULT - SUBJECTIVE AND OBJECTIVE BOX
EARNESTINE BERNSTEIN    [id number]    56y      Female    Patient is a 56y old  Female who presents with a chief complaint of right prox tib fx (23 Apr 2022 04:50)      INTERVAL HPI/OVERNIGHT EVENTS:    Patients pain is well controlled with pain meds, nausea is better, denies fever, chills, chest pain.     REVIEW OF SYSTEMS:    CONSTITUTIONAL: No fever, fatigue  RESPIRATORY: No cough, No shortness of breath  CARDIOVASCULAR: No chest pain, palpitations  GASTROINTESTINAL: No abdominal, No nausea, vomiting  NEUROLOGICAL: No headaches,  loss of strength.  MISCELLANEOUS: No joint swelling or pain       Vital Signs Last 24 Hrs  T(C): 36.8 (23 Apr 2022 04:15), Max: 37.1 (22 Apr 2022 09:33)  T(F): 98.2 (23 Apr 2022 04:15), Max: 98.8 (22 Apr 2022 09:33)  HR: 60 (23 Apr 2022 04:15) (60 - 98)  BP: 128/67 (23 Apr 2022 04:15) (117/79 - 175/99)  RR: 17 (23 Apr 2022 04:15) (16 - 22)  SpO2: 95% (23 Apr 2022 04:15) (95% - 99%)    PHYSICAL EXAM:    GENERAL: Middle age female looking comfortable   HEENT: PERRL, +EOMI  NECK: soft, Supple, No JVD,   CHEST/LUNG: Clear to auscultate bilaterally; No wheezing  HEART: S1S2+, Regular rate and rhythm; No murmurs  ABDOMEN: Soft, Nontender, Nondistended; Bowel sounds present  EXTREMITIES:  1+ Peripheral Pulses, No edema, right leg decrease rom due to pain   SKIN: No rashes or lesions  NEURO: AAOX3, no focal deficits, no motor r sensory loss  PSYCH: normal mood      LABS:                        14.4   12.97 )-----------( 347      ( 22 Apr 2022 10:22 )             43.8     04-22    140  |  102  |  23.3<H>  ----------------------------<  132<H>  3.8   |  21.0<L>  |  0.83    Ca    9.4      22 Apr 2022 10:22    TPro  7.7  /  Alb  4.3  /  TBili  0.3<L>  /  DBili  x   /  AST  34<H>  /  ALT  42<H>  /  AlkPhos  109  04-22    PT/INR - ( 22 Apr 2022 10:22 )   PT: 11.2 sec;   INR: 0.97 ratio         PTT - ( 22 Apr 2022 10:22 )  PTT:24.8 sec        I&O's Summary      MEDICATIONS  (STANDING):  atorvastatin 20 milliGRAM(s) Oral at bedtime  enoxaparin Injectable 40 milliGRAM(s) SubCutaneous every 24 hours  fluticasone propionate 50 MICROgram(s)/spray Nasal Spray 1 Spray(s) Both Nostrils two times a day  mupirocin 2% Ointment 1 Application(s) Both Nostrils two times a day  pantoprazole    Tablet 40 milliGRAM(s) Oral before breakfast  povidone iodine 5% Nasal Swab 1 Application(s) Both Nostrils once    MEDICATIONS  (PRN):  HYDROmorphone  Injectable 0.5 milliGRAM(s) IV Push every 4 hours PRN breakthrough pain  ondansetron Injectable 4 milliGRAM(s) IV Push every 4 hours PRN Nausea and/or Vomiting  oxyCODONE    IR 5 milliGRAM(s) Oral every 3 hours PRN Mild Pain (1 - 3)  oxyCODONE    IR 10 milliGRAM(s) Oral every 3 hours PRN Moderate Pain (4 - 6)

## 2022-04-23 NOTE — PROGRESS NOTE ADULT - NS ATTEND AMEND GEN_ALL_CORE FT
Orthopaedic Trauma Surgeon Addendum:    I have personally performed a face-to-face diagnostic evaluation on this patient.  I have reviewed the physician assistant note and agree with the history, exam, and plan of care, except as noted.  Planning for OR monday    Harpreet Gamez MD  Orthopaedic Trauma Surgeon  [facility]

## 2022-04-23 NOTE — PROGRESS NOTE ADULT - SUBJECTIVE AND OBJECTIVE BOX
Patient was seen and examined at approximately 4:45 am    ORTHO-TRAUMA SERVICE      Pt Name: EARNESTINE BERNSTEIN    MRN: [id number]          PAST MEDICAL & SURGICAL HISTORY:  PAST MEDICAL & SURGICAL HISTORY:      Allergies: No Known Allergies      Medications: atorvastatin 20 milliGRAM(s) Oral at bedtime  fluticasone propionate 50 MICROgram(s)/spray Nasal Spray 1 Spray(s) Both Nostrils two times a day  HYDROmorphone  Injectable 0.5 milliGRAM(s) IV Push every 4 hours PRN  mupirocin 2% Ointment 1 Application(s) Both Nostrils two times a day  ondansetron Injectable 4 milliGRAM(s) IV Push every 4 hours PRN  oxyCODONE    IR 5 milliGRAM(s) Oral every 3 hours PRN  oxyCODONE    IR 10 milliGRAM(s) Oral every 3 hours PRN  pantoprazole    Tablet 40 milliGRAM(s) Oral before breakfast  povidone iodine 5% Nasal Swab 1 Application(s) Both Nostrils once                            14.4   12.97 )-----------( 347      ( 22 Apr 2022 10:22 )             43.8     04-22    140  |  102  |  23.3<H>  ----------------------------<  132<H>  3.8   |  21.0<L>  |  0.83    Ca    9.4      22 Apr 2022 10:22    TPro  7.7  /  Alb  4.3  /  TBili  0.3<L>  /  DBili  x   /  AST  34<H>  /  ALT  42<H>  /  AlkPhos  109  04-22      PHYSICAL EXAM:    Vital Signs Last 24 Hrs  T(C): 36.8 (23 Apr 2022 04:15), Max: 37.1 (22 Apr 2022 09:33)  T(F): 98.2 (23 Apr 2022 04:15), Max: 98.8 (22 Apr 2022 09:33)  HR: 60 (23 Apr 2022 04:15) (60 - 98)  BP: 128/67 (23 Apr 2022 04:15) (117/79 - 175/99)  BP(mean): --  RR: 17 (23 Apr 2022 04:15) (16 - 22)  SpO2: 95% (23 Apr 2022 04:15) (95% - 99%)  Daily     Daily     Appearance: Alert, responsive, in no acute distress.    Neurological: Sensation is grossly intact to light touch.  No focal deficits or weaknesses found.    Vascular: 2+ distal pulses. Cap refill < 2 sec. No extremity ulcerations. No cyanosis.    Musculoskeletal:        Right Lower Extremity: Ace Dressing in place, Knee immobilizer in place. + dorsi/plantarflexion. + EHL/FHL. DP 2+. calf soft. compartments soft and compressible, supple throughout    Imaging Studies:  < from: CT Knee No Cont, Right (04.22.22 @ 13:59) >  ACC: [id number] EXAM:  CT KNEE ONLY RT                          PROCEDURE DATE:  04/22/2022          INTERPRETATION:  CT of the right knee    CLINICAL INFORMATION: Proximal tibial fracture  TECHNIQUE: Axial CT images were obtained of the right knee with coronal   and sagittal reconstructions. No contrast was administered. Three   dimensional reconstructions were performed    FINDINGS:    There is a markedly comminuted fracture the proximal tibia. Fracture   extends through the medial and lateral tibial plateaus. There is marked   depression of the central/posterior lateral tibial plateau by 1.9 cm and   depressed rotated cortical fracture fragment measuring 1.5 cm in the in   the depressed cavity. There is widening of the lateral tibial plateau   jugular surface in the transverse dimension by 1.9 cm and by 1.9 cm in   the AP dimension. There is comminution of the articular surface of the   anterior tibial plateau in the midline and medially. Fracture of the   medial tibial plateau extendsanterior to posterior in a distal oblique   fashion. There are multifocal nondisplaced vertically oriented fracture   components which extend to an obliquely oriented fracture the proximal   tibial diaphysis. There is anterior displacement of the distal fracture   fragment by one cortex width. The proximal fracture fragment anterior   cortex extends slightly into the medullary space of the distal fragment   by 1.4 cm. There is a markedly comminuted fracture the proximal fibular   head and neck. No femur fractures visualized. There is moderate   lipohemarthrosis. No high-grade muscle atrophy. Course of the   neurovascular structures are grossly unremarkable. There is subcutaneous   edema about the anterior knee.    IMPRESSION:    Markedly comminuted fracture the proximal tibia involving the medial   lateral tibial plateaus and proximal tibial shaft. Marked depression and   widening of the lateral tibial plateau.  Comminuted fracture of the fibular head and neck.    --- End of Report ---        DIANA AMIN MD; Attending Radiologist  This document has been electronically signed. Apr 22 2022  3:02PM      A/P:  Pt is a  56y Female with right prox tib fx    PLAN:   Pain control  compartment checks q4h  elevate, Ice, Maintain KI  NWB RLE  DVTp Lov 40mg Qd  plan for OR Monday with Dr. Gamez     Patient was seen and examined at approximately 4:45 am    ORTHO-TRAUMA SERVICE      Pt Name: EARNESTINE BERNSTEIN    MRN: [id number]    Patient is a 56 y.o female being followed for Right Proximal tibia fracture/ tibial plateau fracture. Seen and examined bedside. Patient is comfortable. Denies acute events over night. Denies numbness, tingling, N/V.      PAST MEDICAL & SURGICAL HISTORY:  PAST MEDICAL & SURGICAL HISTORY:      Allergies: No Known Allergies      Medications: atorvastatin 20 milliGRAM(s) Oral at bedtime  fluticasone propionate 50 MICROgram(s)/spray Nasal Spray 1 Spray(s) Both Nostrils two times a day  HYDROmorphone  Injectable 0.5 milliGRAM(s) IV Push every 4 hours PRN  mupirocin 2% Ointment 1 Application(s) Both Nostrils two times a day  ondansetron Injectable 4 milliGRAM(s) IV Push every 4 hours PRN  oxyCODONE    IR 5 milliGRAM(s) Oral every 3 hours PRN  oxyCODONE    IR 10 milliGRAM(s) Oral every 3 hours PRN  pantoprazole    Tablet 40 milliGRAM(s) Oral before breakfast  povidone iodine 5% Nasal Swab 1 Application(s) Both Nostrils once                            14.4   12.97 )-----------( 347      ( 22 Apr 2022 10:22 )             43.8     04-22    140  |  102  |  23.3<H>  ----------------------------<  132<H>  3.8   |  21.0<L>  |  0.83    Ca    9.4      22 Apr 2022 10:22    TPro  7.7  /  Alb  4.3  /  TBili  0.3<L>  /  DBili  x   /  AST  34<H>  /  ALT  42<H>  /  AlkPhos  109  04-22      PHYSICAL EXAM:    Vital Signs Last 24 Hrs  T(C): 36.8 (23 Apr 2022 04:15), Max: 37.1 (22 Apr 2022 09:33)  T(F): 98.2 (23 Apr 2022 04:15), Max: 98.8 (22 Apr 2022 09:33)  HR: 60 (23 Apr 2022 04:15) (60 - 98)  BP: 128/67 (23 Apr 2022 04:15) (117/79 - 175/99)  BP(mean): --  RR: 17 (23 Apr 2022 04:15) (16 - 22)  SpO2: 95% (23 Apr 2022 04:15) (95% - 99%)  Daily     Daily     Appearance: Alert, responsive, in no acute distress.    Neurological: Sensation is grossly intact to light touch.  No focal deficits or weaknesses found.    Vascular: 2+ distal pulses. Cap refill < 2 sec. No extremity ulcerations. No cyanosis.    Musculoskeletal:        Right Lower Extremity: Ace Dressing in place, Knee immobilizer in place. + dorsi/plantarflexion. + EHL/FHL. DP 2+. calf soft. compartments soft and compressible, supple throughout    Imaging Studies:  < from: CT Knee No Cont, Right (04.22.22 @ 13:59) >  ACC: [id number] EXAM:  CT KNEE ONLY RT                          PROCEDURE DATE:  04/22/2022          INTERPRETATION:  CT of the right knee    CLINICAL INFORMATION: Proximal tibial fracture  TECHNIQUE: Axial CT images were obtained of the right knee with coronal   and sagittal reconstructions. No contrast was administered. Three   dimensional reconstructions were performed    FINDINGS:    There is a markedly comminuted fracture the proximal tibia. Fracture   extends through the medial and lateral tibial plateaus. There is marked   depression of the central/posterior lateral tibial plateau by 1.9 cm and   depressed rotated cortical fracture fragment measuring 1.5 cm in the in   the depressed cavity. There is widening of the lateral tibial plateau   jugular surface in the transverse dimension by 1.9 cm and by 1.9 cm in   the AP dimension. There is comminution of the articular surface of the   anterior tibial plateau in the midline and medially. Fracture of the   medial tibial plateau extendsanterior to posterior in a distal oblique   fashion. There are multifocal nondisplaced vertically oriented fracture   components which extend to an obliquely oriented fracture the proximal   tibial diaphysis. There is anterior displacement of the distal fracture   fragment by one cortex width. The proximal fracture fragment anterior   cortex extends slightly into the medullary space of the distal fragment   by 1.4 cm. There is a markedly comminuted fracture the proximal fibular   head and neck. No femur fractures visualized. There is moderate   lipohemarthrosis. No high-grade muscle atrophy. Course of the   neurovascular structures are grossly unremarkable. There is subcutaneous   edema about the anterior knee.    IMPRESSION:    Markedly comminuted fracture the proximal tibia involving the medial   lateral tibial plateaus and proximal tibial shaft. Marked depression and   widening of the lateral tibial plateau.  Comminuted fracture of the fibular head and neck.    --- End of Report ---        DIANA AMIN MD; Attending Radiologist  This document has been electronically signed. Apr 22 2022  3:02PM      A/P:  Pt is a  56y Female with right prox tib fx    PLAN:   Pain control  compartment checks q4h  elevate, Ice, Maintain KI  NWB RLE  DVTp Lov 40mg Qd  plan for OR Monday with Dr. Gamez

## 2022-04-23 NOTE — PROGRESS NOTE ADULT - SUBJECTIVE AND OBJECTIVE BOX
ORTHO-TRAUMA SERVICE    Pt Name: EARNESTINE BERNSTEIN    MRN: [id number]    Patient is a 57 yo female being followed for Right Proximal tibia fracture/ tibial plateau fracture.   Seen and examined resting comfortably in bed in NAD.   RLE in knee immobilizer and elevated  Denies CP, SOB, N/V. numbness/ tingling,     Vital Signs Last 24 Hrs  T(C): 36.8 (23 Apr 2022 04:15), Max: 36.8 (23 Apr 2022 04:15)  T(F): 98.2 (23 Apr 2022 04:15), Max: 98.2 (23 Apr 2022 04:15)  HR: 68 (23 Apr 2022 07:50) (60 - 68)  BP: 128/68 (23 Apr 2022 07:50) (117/79 - 128/68)  BP(mean): --  RR: 18 (23 Apr 2022 07:50) (16 - 22)  SpO2: 97% (23 Apr 2022 07:50) (95% - 98%)                        14.4   12.97 )-----------( 347      ( 22 Apr 2022 10:22 )             43.8     A/P:  Pt is a 56y Female with right proximal tibia fx    PLAN:   Pain control  compartment checks q4h  Continue strict elevation RLE  Maintain knee immobilizer  Ice  NWB RLE  DVTP Lov 40mg Qd  Medicine note/ clearance appreciated  plan for OR Monday with Dr. Gamez

## 2022-04-23 NOTE — PROGRESS NOTE ADULT - SUBJECTIVE AND OBJECTIVE BOX
Pt Name: EARNESTINE BERNSTEIN  MRN: [id number]      Patient is a being followed for right tibial plateau / prox tibial fx. Pt seen for compartment checks. Patient states to be doing well and has been keeping RLE elevated. Patient complaining of knee pain but denies radiation of pain, worsening pain. Patient denies acute motor or sensory changes. Denies numbness, tingling.      PAST MEDICAL & SURGICAL HISTORY:      Allergies: No Known Allergies                            14.4   12.97 )-----------( 347      ( 22 Apr 2022 10:22 )             43.8     04-22    140  |  102  |  23.3<H>  ----------------------------<  132<H>  3.8   |  21.0<L>  |  0.83    Ca    9.4      22 Apr 2022 10:22    TPro  7.7  /  Alb  4.3  /  TBili  0.3<L>  /  DBili  x   /  AST  34<H>  /  ALT  42<H>  /  AlkPhos  109  04-22      PHYSICAL EXAM:    Vital Signs Last 24 Hrs  T(C): 36.7 (23 Apr 2022 11:00), Max: 36.8 (23 Apr 2022 04:15)  T(F): 98.1 (23 Apr 2022 11:00), Max: 98.2 (23 Apr 2022 04:15)  HR: 78 (23 Apr 2022 11:00) (60 - 78)  BP: 153/84 (23 Apr 2022 11:00) (117/79 - 153/84)  BP(mean): --  RR: 18 (23 Apr 2022 11:00) (16 - 20)  SpO2: 95% (23 Apr 2022 11:00) (95% - 98%)  Daily     Daily     Appearance: Alert, responsive, in no acute distress.  Musculoskeletal:       Right Lower Extremity: + KI on and in place. ACE wrapping clean, dry, intact with no bleeding noted. Compartments soft, NT. +DF/PF/EHL/FHL. 2+ DP pulse. Sensation grossly intact to light touch distally.       A/P:  Pt is a  56y Female with right prox tib/tibial plateau fx     PLAN:   Pain control  Continue strict elevation RLE  Maintain knee immobilizer  Ice  NWB RLE  DVTP Lov 40mg Qd  plan for OR Monday with Dr. Gamez Pt Name: EARNESTINE BERNSTEIN  MRN: [id number]      Patient is a being followed for right tibial plateau / prox tibial fx. Pt seen for compartment checks. Patient states to be doing well and has been keeping RLE elevated. Patient complaining of knee pain but denies radiation of pain, worsening pain. Patient denies acute motor or sensory changes. Denies numbness, tingling.      PAST MEDICAL & SURGICAL HISTORY:      Allergies: No Known Allergies                            14.4   12.97 )-----------( 347      ( 22 Apr 2022 10:22 )             43.8     04-22    140  |  102  |  23.3<H>  ----------------------------<  132<H>  3.8   |  21.0<L>  |  0.83    Ca    9.4      22 Apr 2022 10:22    TPro  7.7  /  Alb  4.3  /  TBili  0.3<L>  /  DBili  x   /  AST  34<H>  /  ALT  42<H>  /  AlkPhos  109  04-22      PHYSICAL EXAM:    Vital Signs Last 24 Hrs  T(C): 36.7 (23 Apr 2022 11:00), Max: 36.8 (23 Apr 2022 04:15)  T(F): 98.1 (23 Apr 2022 11:00), Max: 98.2 (23 Apr 2022 04:15)  HR: 78 (23 Apr 2022 11:00) (60 - 78)  BP: 153/84 (23 Apr 2022 11:00) (117/79 - 153/84)  BP(mean): --  RR: 18 (23 Apr 2022 11:00) (16 - 20)  SpO2: 95% (23 Apr 2022 11:00) (95% - 98%)  Daily     Daily     Appearance: Alert, responsive, in no acute distress.  Musculoskeletal:       Right Lower Extremity: + KI on and in place. ACE wrapping clean, dry, intact with no bleeding noted. Compartments soft and compressible, NT. +DF/PF/EHL/FHL. 2+ DP pulse. Sensation grossly intact to light touch distally.       A/P:  Pt is a  56y Female with right prox tib/tibial plateau fx     PLAN:   Pain control  Continue strict elevation RLE  Maintain knee immobilizer  Ice  NWB RLE  DVTP Lov 40mg Qd  plan for OR Monday with Dr. Gamez Pt Name: EARNESTINE BERNSTEIN  MRN: [id number]      Patient is a being followed for right tibial plateau / prox tibial fx. Pt seen for compartment checks. Patient states to be doing well and has been keeping RLE elevated. Patient complaining of knee pain but denies radiation of pain, worsening pain. Patient denies acute motor or sensory changes. Denies numbness, tingling.      PAST MEDICAL & SURGICAL HISTORY:      Allergies: No Known Allergies                            14.4   12.97 )-----------( 347      ( 22 Apr 2022 10:22 )             43.8     04-22    140  |  102  |  23.3<H>  ----------------------------<  132<H>  3.8   |  21.0<L>  |  0.83    Ca    9.4      22 Apr 2022 10:22    TPro  7.7  /  Alb  4.3  /  TBili  0.3<L>  /  DBili  x   /  AST  34<H>  /  ALT  42<H>  /  AlkPhos  109  04-22      PHYSICAL EXAM:    Vital Signs Last 24 Hrs  T(C): 36.7 (23 Apr 2022 11:00), Max: 36.8 (23 Apr 2022 04:15)  T(F): 98.1 (23 Apr 2022 11:00), Max: 98.2 (23 Apr 2022 04:15)  HR: 78 (23 Apr 2022 11:00) (60 - 78)  BP: 153/84 (23 Apr 2022 11:00) (117/79 - 153/84)  BP(mean): --  RR: 18 (23 Apr 2022 11:00) (16 - 20)  SpO2: 95% (23 Apr 2022 11:00) (95% - 98%)  Daily     Daily     Appearance: Alert, responsive, in no acute distress.  Musculoskeletal:       Right Lower Extremity: + KI on and in place. ACE wrapping clean, dry, intact with no bleeding noted. Compartments soft and compressible, NT. +DF/PF/EHL/FHL. 2+ DP pulse. Sensation grossly intact to light touch distally.       A/P:  Pt is a  56y Female with right prox tib/tibial plateau fx     PLAN:   Pain control  Q8 compartment checks   Continue strict elevation RLE  Maintain knee immobilizer  Ice  NWB RLE  DVTP Lov 40mg Qd  plan for OR Monday with Dr. Gamez

## 2022-04-23 NOTE — PROGRESS NOTE ADULT - ASSESSMENT
Patient is a 56y Female with PMH of HLD, Acid Reflux , history of obesity , s/p Lap band 2009 , complicated with aspiration PNA and lung abscess in 2020 , was hospitalized and treated with iv Abx x 6 wks ( s/p PICC line placement ) , follows regularly with Pulmonologist, hx of small lungs nodules  . Last CT chest done 3/30 /22 - read as new small reticular infiltrates in the R middle lobe, minimally enlarged R lung nodules as well new small nodules < 5 mm. Patient presented presenting to the emergency department with a chief complaint of RLE pain x today, s/p mechanical fall. Patient states she tripped and fell off her porch this AM. Denies numbness/tingling. No LOC , admits to chronic sob  on exertion but no chest pain .     1. R proximal tibia fracture post mechanical fall , no LOC -   compartment checks q4h  plan for OR Monday with Dr. Gamez   - pain mgmt   - post op PT/OT once ok from the Ortho   DVT prophylaxis as per primary team   bowel meds     2.Hx of HLD - continue home dose of statins     3. Hx of Acid Reflux - continue PPI    4. Hx of small lung nodules/ chronic sob / hx of Aspiration PNA / abccess   in 2020 - treated , follows with Pulmonologist ,presently no sob , O2 sat on RA 98% ,   CXR - no acute pathology     5. Leucocytosis - no S/S of infection - likely reactive due to fracture     6. BP elevated initially without hx of HTN - likely due to pain ,   repeated well controlled , may use Hydralazine ivp 10 MG prn Q6 hrs for SBP > 170     7. DVT prophylaxis  - as per ortho protocol  Opioid induced constipation  prophylaxis - start  bowel regimen postop     8. Nausea - after pain meds / opioids - added Zofran, feeling better         Labs / EKG / CXR - reviewed , patient has no Hx of CAD/CHF ,   hx of chronic sob on exertion after lung abscess, no chest pain .    Medically  optimized for planned procedure  .   Will follow along with you .    Patient is a 56y Female with PMH of HLD, Acid Reflux , history of obesity , s/p Lap band 2009 , complicated with aspiration PNA and lung abscess in 2020 , was hospitalized and treated with iv Abx x 6 wks ( s/p PICC line placement ) , follows regularly with Pulmonologist, hx of small lungs nodules  . Last CT chest done 3/30 /22 - read as new small reticular infiltrates in the R middle lobe, minimally enlarged R lung nodules as well new small nodules < 5 mm. Patient presented presenting to the emergency department with a chief complaint of RLE pain x today, s/p mechanical fall. Patient states she tripped and fell off her porch this AM. Denies numbness/tingling. No LOC , admits to chronic sob  on exertion but no chest pain .     1. R proximal tibia fracture post mechanical fall , no LOC   compartment checks q4h  plan for OR Monday with Dr. Gamez   - pain mgmt   - post op PT/OT once ok from the Ortho   DVT prophylaxis as per primary team   bowel meds     2.Hx of HLD - continue home dose of statins     3. Hx of Acid Reflux - continue PPI    4. Hx of small lung nodules/ chronic sob / hx of Aspiration PNA / abccess   in 2020 - treated , follows with Pulmonologist ,presently no sob , O2 sat on RA 98%, CXR - no acute pathology     5. Leucocytosis - no S/S of infection - likely reactive due to fracture     6. BP elevated initially without hx of HTN - likely due to pain ,   repeated well controlled , may use Hydralazine ivp 10 MG prn Q6 hrs for SBP > 170     7. DVT prophylaxis  - as per ortho protocol  Opioid induced constipation  prophylaxis - start  bowel regimen postop     8. Nausea - after pain meds / opioids - added Zofran, feeling better       Labs / EKG / CXR - reviewed , patient has no Hx of CAD/CHF ,   hx of chronic sob on exertion after lung abscess, no chest pain .    Medically  optimized for planned procedure .   Will follow along with you .

## 2022-04-24 ENCOUNTER — TRANSCRIPTION ENCOUNTER (OUTPATIENT)
Age: 57
End: 2022-04-24

## 2022-04-24 LAB
ANION GAP SERPL CALC-SCNC: 10 MMOL/L — SIGNIFICANT CHANGE UP (ref 5–17)
BASOPHILS # BLD AUTO: 0.05 K/UL — SIGNIFICANT CHANGE UP (ref 0–0.2)
BASOPHILS NFR BLD AUTO: 0.4 % — SIGNIFICANT CHANGE UP (ref 0–2)
BUN SERPL-MCNC: 13.7 MG/DL — SIGNIFICANT CHANGE UP (ref 8–20)
CALCIUM SERPL-MCNC: 8.9 MG/DL — SIGNIFICANT CHANGE UP (ref 8.6–10.2)
CHLORIDE SERPL-SCNC: 102 MMOL/L — SIGNIFICANT CHANGE UP (ref 98–107)
CO2 SERPL-SCNC: 26 MMOL/L — SIGNIFICANT CHANGE UP (ref 22–29)
CREAT SERPL-MCNC: 0.82 MG/DL — SIGNIFICANT CHANGE UP (ref 0.5–1.3)
EGFR: 83 ML/MIN/1.73M2 — SIGNIFICANT CHANGE UP
EOSINOPHIL # BLD AUTO: 0.19 K/UL — SIGNIFICANT CHANGE UP (ref 0–0.5)
EOSINOPHIL NFR BLD AUTO: 1.6 % — SIGNIFICANT CHANGE UP (ref 0–6)
GLUCOSE SERPL-MCNC: 118 MG/DL — HIGH (ref 70–99)
HCT VFR BLD CALC: 35 % — SIGNIFICANT CHANGE UP (ref 34.5–45)
HCV AB S/CO SERPL IA: 0.09 S/CO — SIGNIFICANT CHANGE UP (ref 0–0.99)
HCV AB SERPL-IMP: SIGNIFICANT CHANGE UP
HGB BLD-MCNC: 11 G/DL — LOW (ref 11.5–15.5)
IMM GRANULOCYTES NFR BLD AUTO: 0.8 % — SIGNIFICANT CHANGE UP (ref 0–1.5)
LYMPHOCYTES # BLD AUTO: 2.58 K/UL — SIGNIFICANT CHANGE UP (ref 1–3.3)
LYMPHOCYTES # BLD AUTO: 22.1 % — SIGNIFICANT CHANGE UP (ref 13–44)
MCHC RBC-ENTMCNC: 28.6 PG — SIGNIFICANT CHANGE UP (ref 27–34)
MCHC RBC-ENTMCNC: 31.4 GM/DL — LOW (ref 32–36)
MCV RBC AUTO: 91.1 FL — SIGNIFICANT CHANGE UP (ref 80–100)
MONOCYTES # BLD AUTO: 0.97 K/UL — HIGH (ref 0–0.9)
MONOCYTES NFR BLD AUTO: 8.3 % — SIGNIFICANT CHANGE UP (ref 2–14)
NEUTROPHILS # BLD AUTO: 7.77 K/UL — HIGH (ref 1.8–7.4)
NEUTROPHILS NFR BLD AUTO: 66.8 % — SIGNIFICANT CHANGE UP (ref 43–77)
PLATELET # BLD AUTO: 294 K/UL — SIGNIFICANT CHANGE UP (ref 150–400)
POTASSIUM SERPL-MCNC: 4.4 MMOL/L — SIGNIFICANT CHANGE UP (ref 3.5–5.3)
POTASSIUM SERPL-SCNC: 4.4 MMOL/L — SIGNIFICANT CHANGE UP (ref 3.5–5.3)
RBC # BLD: 3.84 M/UL — SIGNIFICANT CHANGE UP (ref 3.8–5.2)
RBC # FLD: 13.3 % — SIGNIFICANT CHANGE UP (ref 10.3–14.5)
SODIUM SERPL-SCNC: 138 MMOL/L — SIGNIFICANT CHANGE UP (ref 135–145)
WBC # BLD: 11.65 K/UL — HIGH (ref 3.8–10.5)
WBC # FLD AUTO: 11.65 K/UL — HIGH (ref 3.8–10.5)

## 2022-04-24 PROCEDURE — 99231 SBSQ HOSP IP/OBS SF/LOW 25: CPT | Mod: 57

## 2022-04-24 PROCEDURE — 99232 SBSQ HOSP IP/OBS MODERATE 35: CPT

## 2022-04-24 RX ADMIN — OXYCODONE HYDROCHLORIDE 10 MILLIGRAM(S): 5 TABLET ORAL at 04:30

## 2022-04-24 RX ADMIN — ATORVASTATIN CALCIUM 20 MILLIGRAM(S): 80 TABLET, FILM COATED ORAL at 22:55

## 2022-04-24 RX ADMIN — HYDROMORPHONE HYDROCHLORIDE 0.5 MILLIGRAM(S): 2 INJECTION INTRAMUSCULAR; INTRAVENOUS; SUBCUTANEOUS at 08:02

## 2022-04-24 RX ADMIN — Medication 975 MILLIGRAM(S): at 23:30

## 2022-04-24 RX ADMIN — Medication 975 MILLIGRAM(S): at 06:34

## 2022-04-24 RX ADMIN — OXYCODONE HYDROCHLORIDE 10 MILLIGRAM(S): 5 TABLET ORAL at 11:42

## 2022-04-24 RX ADMIN — Medication 975 MILLIGRAM(S): at 07:00

## 2022-04-24 RX ADMIN — OXYCODONE HYDROCHLORIDE 10 MILLIGRAM(S): 5 TABLET ORAL at 21:00

## 2022-04-24 RX ADMIN — OXYCODONE HYDROCHLORIDE 10 MILLIGRAM(S): 5 TABLET ORAL at 20:24

## 2022-04-24 RX ADMIN — Medication 975 MILLIGRAM(S): at 15:48

## 2022-04-24 RX ADMIN — OXYCODONE HYDROCHLORIDE 10 MILLIGRAM(S): 5 TABLET ORAL at 07:28

## 2022-04-24 RX ADMIN — HYDROMORPHONE HYDROCHLORIDE 0.5 MILLIGRAM(S): 2 INJECTION INTRAMUSCULAR; INTRAVENOUS; SUBCUTANEOUS at 08:35

## 2022-04-24 RX ADMIN — OXYCODONE HYDROCHLORIDE 10 MILLIGRAM(S): 5 TABLET ORAL at 12:40

## 2022-04-24 RX ADMIN — OXYCODONE HYDROCHLORIDE 10 MILLIGRAM(S): 5 TABLET ORAL at 17:30

## 2022-04-24 RX ADMIN — OXYCODONE HYDROCHLORIDE 10 MILLIGRAM(S): 5 TABLET ORAL at 03:24

## 2022-04-24 RX ADMIN — ENOXAPARIN SODIUM 40 MILLIGRAM(S): 100 INJECTION SUBCUTANEOUS at 06:33

## 2022-04-24 RX ADMIN — MUPIROCIN 1 APPLICATION(S): 20 OINTMENT TOPICAL at 06:34

## 2022-04-24 RX ADMIN — OXYCODONE HYDROCHLORIDE 10 MILLIGRAM(S): 5 TABLET ORAL at 06:51

## 2022-04-24 RX ADMIN — OXYCODONE HYDROCHLORIDE 10 MILLIGRAM(S): 5 TABLET ORAL at 23:38

## 2022-04-24 RX ADMIN — Medication 975 MILLIGRAM(S): at 22:55

## 2022-04-24 RX ADMIN — Medication 1 SPRAY(S): at 06:34

## 2022-04-24 RX ADMIN — OXYCODONE HYDROCHLORIDE 10 MILLIGRAM(S): 5 TABLET ORAL at 16:43

## 2022-04-24 RX ADMIN — PANTOPRAZOLE SODIUM 40 MILLIGRAM(S): 20 TABLET, DELAYED RELEASE ORAL at 06:34

## 2022-04-24 RX ADMIN — MUPIROCIN 1 APPLICATION(S): 20 OINTMENT TOPICAL at 16:43

## 2022-04-24 RX ADMIN — Medication 975 MILLIGRAM(S): at 16:25

## 2022-04-24 NOTE — PROGRESS NOTE ADULT - SUBJECTIVE AND OBJECTIVE BOX
Pt resting comfortably in bed, states that pain is well controlled at this time. Denies numbness/tingling.    Physical exam:             Right lower ext- Knee immobilizer in place with ACE/webroll of the RLE. All compartments are soft and compressible. 2+ DP pulse. Foot warm and normal in color. cap refill < 2 sec.       No concerns for acute compartments syndrome at this time.

## 2022-04-24 NOTE — PROGRESS NOTE ADULT - NS PANP OPT1 GEN_ALL_CORE
I attest my time as PA/NP is greater than 50% of the total combined time spent on qualifying patient care activities by the PA/NP and attending. (0) independent

## 2022-04-24 NOTE — PROGRESS NOTE ADULT - SUBJECTIVE AND OBJECTIVE BOX
ORTHO-TRAUMA SERVICE    Pt Name: EARNESTINE BERNSTEIN    MRN: [id number]    Patient is a 55 yo female being followed for Right Proximal tibia fracture/ tibial plateau fracture.   Seen and examined resting comfortably in bed in NAD.   RLE in knee immobilizer and elevated  Patient reports improvement of pain  Denies CP, SOB, N/V. numbness/ tingling,   No new complaints    Vital Signs Last 24 Hrs  T(C): 36.8 (24 Apr 2022 05:00), Max: 36.8 (24 Apr 2022 05:00)  T(F): 98.3 (24 Apr 2022 05:00), Max: 98.3 (24 Apr 2022 05:00)  HR: 71 (24 Apr 2022 08:00) (61 - 78)  BP: 126/60 (24 Apr 2022 08:00) (113/57 - 153/84)  BP(mean): --  RR: 17 (24 Apr 2022 08:00) (17 - 18)  SpO2: 97% (24 Apr 2022 08:00) (94% - 98%)                                 11.0   11.65 )-----------( 294      ( 24 Apr 2022 06:23 )             35.0     A/P:  Pt is a 56y Female with right proximal tibia fx    PLAN:     NPO after midnight  Pain control  compartment checks q8h  Continue ICE/ strict elevation RLE  Maintain knee immobilizer  Ice  NWB RLE  DVTP Lov 40mg Qd  Medicine note/ clearance appreciated  plan for OR Monday with Dr. Gamez

## 2022-04-24 NOTE — PROGRESS NOTE ADULT - ASSESSMENT
Patient is a 56y Female with PMH of HLD, Acid Reflux , history of obesity , s/p Lap band 2009 , complicated with aspiration PNA and lung abscess in 2020 , was hospitalized and treated with iv Abx x 6 wks ( s/p PICC line placement ) , follows regularly with Pulmonologist, hx of small lungs nodules  . Last CT chest done 3/30 /22 - read as new small reticular infiltrates in the R middle lobe, minimally enlarged R lung nodules as well new small nodules < 5 mm. Patient presented presenting to the emergency department with a chief complaint of RLE pain x today, s/p mechanical fall. Patient states she tripped and fell off her porch this AM. Denies numbness/tingling. No LOC , admits to chronic sob  on exertion but no chest pain .     1. R proximal tibia fracture post mechanical fall, no LOC   compartment checks q4h  plan for OR Monday with Dr. Gamez   - pain mgmt   - post op PT/OT once ok from the Ortho   DVT prophylaxis as per primary team   bowel meds     2.Hx of HLD - continue home dose of statins     3. Hx of Acid Reflux - continue PPI    4. Hx of small lung nodules/ chronic sob / hx of Aspiration PNA / abccess   in 2020 - treated , follows with Pulmonologist ,presently no sob , O2 sat on RA 98%, CXR - no acute pathology     5. Leucocytosis - no S/S of infection - likely reactive due to fracture     6. BP elevated initially without hx of HTN - likely due to pain ,   repeated well controlled , may use Hydralazine ivp 10 MG prn Q6 hrs for SBP > 170     7. DVT prophylaxis  - as per ortho protocol  Opioid induced constipation  prophylaxis - start  bowel regimen postop     8. Nausea - after pain meds / opioids - added Zofran, feeling better     Labs / EKG / CXR - reviewed , patient has no Hx of CAD/CHF ,   hx of chronic sob on exertion after lung abscess, no chest pain .    Medically  optimized for planned procedure .   Will follow along with you .

## 2022-04-24 NOTE — PROGRESS NOTE ADULT - SUBJECTIVE AND OBJECTIVE BOX
EARNESTINE BERNSTEIN    [id number]    56y      Female    Patient is a 56y old  Female who presents with a chief complaint of right prox tib fx (24 Apr 2022 09:34)      INTERVAL HPI/OVERNIGHT EVENTS:    Patients pain is well controlled with pain meds, denies fever, chills, chest pain, no nausea or vomiting      REVIEW OF SYSTEMS:    CONSTITUTIONAL: No fever, fatigue  RESPIRATORY: No cough, No shortness of breath  CARDIOVASCULAR: No chest pain, palpitations  GASTROINTESTINAL: No abdominal, No nausea, vomiting  NEUROLOGICAL: No headaches,  loss of strength.  MISCELLANEOUS: No joint swelling or pain       Vital Signs Last 24 Hrs  T(C): 36.8 (24 Apr 2022 05:00), Max: 36.8 (24 Apr 2022 05:00)  T(F): 98.3 (24 Apr 2022 05:00), Max: 98.3 (24 Apr 2022 05:00)  HR: 71 (24 Apr 2022 08:00) (61 - 78)  BP: 126/60 (24 Apr 2022 08:00) (113/57 - 153/84)RR: 17 (24 Apr 2022 08:00) (17 - 18)  SpO2: 97% (24 Apr 2022 08:00) (94% - 98%)    PHYSICAL EXAM:    GENERAL: Middle age female looking comfortable   HEENT: PERRL, +EOMI  NECK: soft, Supple, No JVD,   CHEST/LUNG: Clear to auscultate bilaterally; No wheezing  HEART: S1S2+, Regular rate and rhythm; No murmurs  ABDOMEN: Soft, Nontender, Nondistended; Bowel sounds present  EXTREMITIES:  1+ Peripheral Pulses, No edema, right leg decrease rom due to pain   SKIN: No rashes or lesions  NEURO: AAOX3, no focal deficits, no motor r sensory loss  PSYCH: normal mood      LABS:                        11.0   11.65 )-----------( 294      ( 24 Apr 2022 06:23 )             35.0     04-24    138  |  102  |  13.7  ----------------------------<  118<H>  4.4   |  26.0  |  0.82    Ca    8.9      24 Apr 2022 06:23    TPro  7.7  /  Alb  4.3  /  TBili  0.3<L>  /  DBili  x   /  AST  34<H>  /  ALT  42<H>  /  AlkPhos  109  04-22    PT/INR - ( 22 Apr 2022 10:22 )   PT: 11.2 sec;   INR: 0.97 ratio         PTT - ( 22 Apr 2022 10:22 )  PTT:24.8 sec        I&O's Summary    23 Apr 2022 07:01  -  24 Apr 2022 07:00  --------------------------------------------------------  IN: 0 mL / OUT: 1200 mL / NET: -1200 mL        MEDICATIONS  (STANDING):  acetaminophen     Tablet .. 975 milliGRAM(s) Oral every 8 hours  atorvastatin 20 milliGRAM(s) Oral at bedtime  fluticasone propionate 50 MICROgram(s)/spray Nasal Spray 1 Spray(s) Both Nostrils two times a day  mupirocin 2% Ointment 1 Application(s) Both Nostrils two times a day  pantoprazole    Tablet 40 milliGRAM(s) Oral before breakfast  povidone iodine 5% Nasal Swab 1 Application(s) Both Nostrils once    MEDICATIONS  (PRN):  HYDROmorphone  Injectable 0.5 milliGRAM(s) IV Push every 4 hours PRN breakthrough pain  ondansetron Injectable 4 milliGRAM(s) IV Push every 4 hours PRN Nausea and/or Vomiting  oxyCODONE    IR 5 milliGRAM(s) Oral every 3 hours PRN Mild Pain (1 - 3)  oxyCODONE    IR 10 milliGRAM(s) Oral every 3 hours PRN Moderate Pain (4 - 6)

## 2022-04-24 NOTE — PROGRESS NOTE ADULT - SUBJECTIVE AND OBJECTIVE BOX
Pt Name: EARNESTINE BERNSTEIN    MRN: [id number]            PAST MEDICAL & SURGICAL HISTORY:  PAST MEDICAL & SURGICAL HISTORY:      Allergies: No Known Allergies      Medications: acetaminophen     Tablet .. 975 milliGRAM(s) Oral every 8 hours  atorvastatin 20 milliGRAM(s) Oral at bedtime  enoxaparin Injectable 40 milliGRAM(s) SubCutaneous every 24 hours  fluticasone propionate 50 MICROgram(s)/spray Nasal Spray 1 Spray(s) Both Nostrils two times a day  HYDROmorphone  Injectable 0.5 milliGRAM(s) IV Push every 4 hours PRN  mupirocin 2% Ointment 1 Application(s) Both Nostrils two times a day  ondansetron Injectable 4 milliGRAM(s) IV Push every 4 hours PRN  oxyCODONE    IR 5 milliGRAM(s) Oral every 3 hours PRN  oxyCODONE    IR 10 milliGRAM(s) Oral every 3 hours PRN  pantoprazole    Tablet 40 milliGRAM(s) Oral before breakfast  povidone iodine 5% Nasal Swab 1 Application(s) Both Nostrils once        Ambulation: Walking independently [ ] With Cane [ ] With Walker [ ]  Bedbound [ ]                           14.4   12.97 )-----------( 347      ( 22 Apr 2022 10:22 )             43.8     04-22    140  |  102  |  23.3<H>  ----------------------------<  132<H>  3.8   |  21.0<L>  |  0.83    Ca    9.4      22 Apr 2022 10:22    TPro  7.7  /  Alb  4.3  /  TBili  0.3<L>  /  DBili  x   /  AST  34<H>  /  ALT  42<H>  /  AlkPhos  109  04-22      PHYSICAL EXAM:    Vital Signs Last 24 Hrs  T(C): 36.7 (23 Apr 2022 17:11), Max: 36.7 (23 Apr 2022 11:00)  T(F): 98.1 (23 Apr 2022 17:11), Max: 98.1 (23 Apr 2022 11:00)  HR: 62 (23 Apr 2022 22:16) (61 - 78)  BP: 136/68 (23 Apr 2022 22:16) (128/68 - 153/84)  BP(mean): --  RR: 18 (23 Apr 2022 22:16) (18 - 18)  SpO2: 98% (23 Apr 2022 22:16) (95% - 98%)  Daily     Daily     Appearance: Alert, responsive, in no acute distress.    Neurological: Sensation is grossly intact to light touch. 5/5 motor function of all extremities. No focal deficits or weaknesses found.    Skin: no rash on visible skin. Skin is clean, dry and intact. No bleeding. No abrasions. No ulcerations.    Vascular: 2+ distal pulses. Cap refill < 2 sec. No signs of venous   insufficiency   or stasis. No extremity ulcerations. No cyanosis.    Musculoskeletal:         Left Upper Extremity:       Right Upper Extremity:       Left Lower Extremity:       Right Lower Extremity:      A/P:  Pt is a  56y Female with     PLAN:   *  Pt Name: EARNESTINE BERNSTEIN    MRN: [id number]          Patient is a being followed for right tibial plateau/ prox tibial fracture  Patient seen and examined at bedside for compartment checks.  Pt states is doing well with pain controlled with pain management  Pt denies any numbness, tingling,          PAST MEDICAL & SURGICAL HISTORY:  PAST MEDICAL & SURGICAL HISTORY:      Allergies: No Known Allergies      Medications: acetaminophen     Tablet .. 975 milliGRAM(s) Oral every 8 hours  atorvastatin 20 milliGRAM(s) Oral at bedtime  enoxaparin Injectable 40 milliGRAM(s) SubCutaneous every 24 hours  fluticasone propionate 50 MICROgram(s)/spray Nasal Spray 1 Spray(s) Both Nostrils two times a day  HYDROmorphone  Injectable 0.5 milliGRAM(s) IV Push every 4 hours PRN  mupirocin 2% Ointment 1 Application(s) Both Nostrils two times a day  ondansetron Injectable 4 milliGRAM(s) IV Push every 4 hours PRN  oxyCODONE    IR 5 milliGRAM(s) Oral every 3 hours PRN  oxyCODONE    IR 10 milliGRAM(s) Oral every 3 hours PRN  pantoprazole    Tablet 40 milliGRAM(s) Oral before breakfast  povidone iodine 5% Nasal Swab 1 Application(s) Both Nostrils once        Ambulation: Walking independently [ ] With Cane [ ] With Walker [ ]  Bedbound [ ]                           14.4   12.97 )-----------( 347      ( 22 Apr 2022 10:22 )             43.8     04-22    140  |  102  |  23.3<H>  ----------------------------<  132<H>  3.8   |  21.0<L>  |  0.83    Ca    9.4      22 Apr 2022 10:22    TPro  7.7  /  Alb  4.3  /  TBili  0.3<L>  /  DBili  x   /  AST  34<H>  /  ALT  42<H>  /  AlkPhos  109  04-22      PHYSICAL EXAM:    Vital Signs Last 24 Hrs  T(C): 36.7 (23 Apr 2022 17:11), Max: 36.7 (23 Apr 2022 11:00)  T(F): 98.1 (23 Apr 2022 17:11), Max: 98.1 (23 Apr 2022 11:00)  HR: 62 (23 Apr 2022 22:16) (61 - 78)  BP: 136/68 (23 Apr 2022 22:16) (128/68 - 153/84)  BP(mean): --  RR: 18 (23 Apr 2022 22:16) (18 - 18)  SpO2: 98% (23 Apr 2022 22:16) (95% - 98%)  Daily     Daily     Appearance: Alert, responsive, in no acute distress.    Neurological: Sensation is grossly intact to light touch. 5/5 motor function of all extremities. No focal deficits or weaknesses found.    Skin: no rash on visible skin. Skin is clean, dry and intact. No bleeding. No abrasions. No ulcerations.    Vascular: 2+ distal pulses. Cap refill < 2 sec. No signs of venous   insufficiency   or stasis. No extremity ulcerations. No cyanosis.    Musculoskeletal:         Left Upper Extremity:       Right Upper Extremity:       Left Lower Extremity:       Right Lower Extremity:      A/P:  Pt is a  56y Female with     PLAN:   *  Pt Name: EARNESTINE BERNSTEIN    MRN: [id number]          Patient is a being followed for right tibial plateau/ prox tibial fracture  Pt resting comfortably at this time   Patient seen and examined at bedside for compartment checks.  Pt states is doing well with pain controlled with pain management  Pt denies any numbness, tingling,          PAST MEDICAL & SURGICAL HISTORY:  PAST MEDICAL & SURGICAL HISTORY:      Allergies: No Known Allergies      Medications: acetaminophen     Tablet .. 975 milliGRAM(s) Oral every 8 hours  atorvastatin 20 milliGRAM(s) Oral at bedtime  enoxaparin Injectable 40 milliGRAM(s) SubCutaneous every 24 hours  fluticasone propionate 50 MICROgram(s)/spray Nasal Spray 1 Spray(s) Both Nostrils two times a day  HYDROmorphone  Injectable 0.5 milliGRAM(s) IV Push every 4 hours PRN  mupirocin 2% Ointment 1 Application(s) Both Nostrils two times a day  ondansetron Injectable 4 milliGRAM(s) IV Push every 4 hours PRN  oxyCODONE    IR 5 milliGRAM(s) Oral every 3 hours PRN  oxyCODONE    IR 10 milliGRAM(s) Oral every 3 hours PRN  pantoprazole    Tablet 40 milliGRAM(s) Oral before breakfast  povidone iodine 5% Nasal Swab 1 Application(s) Both Nostrils once                              14.4   12.97 )-----------( 347      ( 22 Apr 2022 10:22 )             43.8     04-22    140  |  102  |  23.3<H>  ----------------------------<  132<H>  3.8   |  21.0<L>  |  0.83    Ca    9.4      22 Apr 2022 10:22    TPro  7.7  /  Alb  4.3  /  TBili  0.3<L>  /  DBili  x   /  AST  34<H>  /  ALT  42<H>  /  AlkPhos  109  04-22      PHYSICAL EXAM:    Vital Signs Last 24 Hrs  T(C): 36.7 (23 Apr 2022 17:11), Max: 36.7 (23 Apr 2022 11:00)  T(F): 98.1 (23 Apr 2022 17:11), Max: 98.1 (23 Apr 2022 11:00)  HR: 62 (23 Apr 2022 22:16) (61 - 78)  BP: 136/68 (23 Apr 2022 22:16) (128/68 - 153/84)  BP(mean): --  RR: 18 (23 Apr 2022 22:16) (18 - 18)  SpO2: 98% (23 Apr 2022 22:16) (95% - 98%)  Daily     Daily     Appearance: Alert, responsive, in no acute distress.    Neurological: Sensation is grossly intact to light touch. 5/5 motor function of all extremities. No focal deficits or weaknesses found.    Skin: no rash on visible skin. Skin is clean, dry and intact. No bleeding. No abrasions. No ulcerations.    Vascular: 2+ distal pulses. Cap refill < 2 sec. No signs of venous   insufficiency   or stasis. No extremity ulcerations. No cyanosis.    Musculoskeletal:            Right Lower Extremity: Elevated with pillows                                       Dressing c/d/i                                       KI intact                                       Compartments soft, compressible and NT                                      +DF/PF/EHL/FHL                                       2+ DP pulse                                       +SILT                                       Neurovascularly intact distally                             A/P:  Pt is a  56y Female with right prox tib/tibial plateau fracture    PLAN:   Pain control prn  Q8 compartment checks   Continue strict elevation RLE & ice  Maintain knee immobilizer  NWB RLE  DVTP Lov 40mg Qd  plan for OR Monday with Dr. Gamez         Pt Name: EARNESTINE BERNSTEIN    MRN: [id number]          Patient is a being followed for right tibial plateau/ prox tibial fracture  Pt resting comfortably at this time   Patient seen and examined at bedside for compartment checks.  Pt states is doing well with pain controlled with pain management  Pt denies any numbness, tingling,          PAST MEDICAL & SURGICAL HISTORY:  PAST MEDICAL & SURGICAL HISTORY:      Allergies: No Known Allergies      Medications: acetaminophen     Tablet .. 975 milliGRAM(s) Oral every 8 hours  atorvastatin 20 milliGRAM(s) Oral at bedtime  enoxaparin Injectable 40 milliGRAM(s) SubCutaneous every 24 hours  fluticasone propionate 50 MICROgram(s)/spray Nasal Spray 1 Spray(s) Both Nostrils two times a day  HYDROmorphone  Injectable 0.5 milliGRAM(s) IV Push every 4 hours PRN  mupirocin 2% Ointment 1 Application(s) Both Nostrils two times a day  ondansetron Injectable 4 milliGRAM(s) IV Push every 4 hours PRN  oxyCODONE    IR 5 milliGRAM(s) Oral every 3 hours PRN  oxyCODONE    IR 10 milliGRAM(s) Oral every 3 hours PRN  pantoprazole    Tablet 40 milliGRAM(s) Oral before breakfast  povidone iodine 5% Nasal Swab 1 Application(s) Both Nostrils once                              14.4   12.97 )-----------( 347      ( 22 Apr 2022 10:22 )             43.8     04-22    140  |  102  |  23.3<H>  ----------------------------<  132<H>  3.8   |  21.0<L>  |  0.83    Ca    9.4      22 Apr 2022 10:22    TPro  7.7  /  Alb  4.3  /  TBili  0.3<L>  /  DBili  x   /  AST  34<H>  /  ALT  42<H>  /  AlkPhos  109  04-22      PHYSICAL EXAM:    Vital Signs Last 24 Hrs  T(C): 36.7 (23 Apr 2022 17:11), Max: 36.7 (23 Apr 2022 11:00)  T(F): 98.1 (23 Apr 2022 17:11), Max: 98.1 (23 Apr 2022 11:00)  HR: 62 (23 Apr 2022 22:16) (61 - 78)  BP: 136/68 (23 Apr 2022 22:16) (128/68 - 153/84)  BP(mean): --  RR: 18 (23 Apr 2022 22:16) (18 - 18)  SpO2: 98% (23 Apr 2022 22:16) (95% - 98%)  Daily     Daily     Appearance: Alert, responsive, in no acute distress.    Musculoskeletal:        Right Lower Extremity: Elevated with pillows                                       Dressing c/d/i                                       KI intact                                       Compartments soft, compressible and NT                                      +DF/PF/EHL/FHL                                       2+ DP pulse                                       +SILT                                       Neurovascularly intact distally                             A/P:  Pt is a  56y Female with right prox tib/tibial plateau fracture    PLAN:   Pain control prn  Q8 compartment checks   Continue strict elevation RLE & ice  Maintain knee immobilizer  NWB RLE  DVTP Lov 40mg Qd  plan for OR Monday with Dr. Gamez

## 2022-04-25 ENCOUNTER — TRANSCRIPTION ENCOUNTER (OUTPATIENT)
Age: 57
End: 2022-04-25

## 2022-04-25 DIAGNOSIS — S82.141A DISPLACED BICONDYLAR FRACTURE OF RIGHT TIBIA, INITIAL ENCOUNTER FOR CLOSED FRACTURE: ICD-10-CM

## 2022-04-25 LAB
ANION GAP SERPL CALC-SCNC: 14 MMOL/L — SIGNIFICANT CHANGE UP (ref 5–17)
BASOPHILS # BLD AUTO: 0.06 K/UL — SIGNIFICANT CHANGE UP (ref 0–0.2)
BASOPHILS NFR BLD AUTO: 0.6 % — SIGNIFICANT CHANGE UP (ref 0–2)
BUN SERPL-MCNC: 12.9 MG/DL — SIGNIFICANT CHANGE UP (ref 8–20)
CALCIUM SERPL-MCNC: 8.8 MG/DL — SIGNIFICANT CHANGE UP (ref 8.6–10.2)
CHLORIDE SERPL-SCNC: 102 MMOL/L — SIGNIFICANT CHANGE UP (ref 98–107)
CO2 SERPL-SCNC: 25 MMOL/L — SIGNIFICANT CHANGE UP (ref 22–29)
CREAT SERPL-MCNC: 0.82 MG/DL — SIGNIFICANT CHANGE UP (ref 0.5–1.3)
EGFR: 83 ML/MIN/1.73M2 — SIGNIFICANT CHANGE UP
EOSINOPHIL # BLD AUTO: 0.28 K/UL — SIGNIFICANT CHANGE UP (ref 0–0.5)
EOSINOPHIL NFR BLD AUTO: 3 % — SIGNIFICANT CHANGE UP (ref 0–6)
GLUCOSE SERPL-MCNC: 136 MG/DL — HIGH (ref 70–99)
HCT VFR BLD CALC: 34.5 % — SIGNIFICANT CHANGE UP (ref 34.5–45)
HGB BLD-MCNC: 10.9 G/DL — LOW (ref 11.5–15.5)
IMM GRANULOCYTES NFR BLD AUTO: 0.8 % — SIGNIFICANT CHANGE UP (ref 0–1.5)
LYMPHOCYTES # BLD AUTO: 2.92 K/UL — SIGNIFICANT CHANGE UP (ref 1–3.3)
LYMPHOCYTES # BLD AUTO: 31.3 % — SIGNIFICANT CHANGE UP (ref 13–44)
MCHC RBC-ENTMCNC: 28.8 PG — SIGNIFICANT CHANGE UP (ref 27–34)
MCHC RBC-ENTMCNC: 31.6 GM/DL — LOW (ref 32–36)
MCV RBC AUTO: 91 FL — SIGNIFICANT CHANGE UP (ref 80–100)
MONOCYTES # BLD AUTO: 0.7 K/UL — SIGNIFICANT CHANGE UP (ref 0–0.9)
MONOCYTES NFR BLD AUTO: 7.5 % — SIGNIFICANT CHANGE UP (ref 2–14)
NEUTROPHILS # BLD AUTO: 5.29 K/UL — SIGNIFICANT CHANGE UP (ref 1.8–7.4)
NEUTROPHILS NFR BLD AUTO: 56.8 % — SIGNIFICANT CHANGE UP (ref 43–77)
PLATELET # BLD AUTO: 298 K/UL — SIGNIFICANT CHANGE UP (ref 150–400)
POTASSIUM SERPL-MCNC: 4 MMOL/L — SIGNIFICANT CHANGE UP (ref 3.5–5.3)
POTASSIUM SERPL-SCNC: 4 MMOL/L — SIGNIFICANT CHANGE UP (ref 3.5–5.3)
RAPID RVP RESULT: SIGNIFICANT CHANGE UP
RBC # BLD: 3.79 M/UL — LOW (ref 3.8–5.2)
RBC # FLD: 13.2 % — SIGNIFICANT CHANGE UP (ref 10.3–14.5)
SARS-COV-2 RNA SPEC QL NAA+PROBE: SIGNIFICANT CHANGE UP
SODIUM SERPL-SCNC: 141 MMOL/L — SIGNIFICANT CHANGE UP (ref 135–145)
WBC # BLD: 9.32 K/UL — SIGNIFICANT CHANGE UP (ref 3.8–10.5)
WBC # FLD AUTO: 9.32 K/UL — SIGNIFICANT CHANGE UP (ref 3.8–10.5)

## 2022-04-25 PROCEDURE — 99232 SBSQ HOSP IP/OBS MODERATE 35: CPT

## 2022-04-25 PROCEDURE — 27403 REPAIR OF KNEE CARTILAGE: CPT | Mod: RT

## 2022-04-25 PROCEDURE — 73590 X-RAY EXAM OF LOWER LEG: CPT | Mod: 26,RT

## 2022-04-25 PROCEDURE — 27536 TREAT KNEE FRACTURE: CPT | Mod: RT

## 2022-04-25 DEVICE — SCREW LOKG VAR ANG 3.5X65MM: Type: IMPLANTABLE DEVICE | Status: FUNCTIONAL

## 2022-04-25 DEVICE — SCREW LOKG SLF-TPNG W/ STARDRIVE RECESS 3.5X50MM: Type: IMPLANTABLE DEVICE | Status: FUNCTIONAL

## 2022-04-25 DEVICE — SCREW CORT S-T 3.5X30MM: Type: IMPLANTABLE DEVICE | Status: FUNCTIONAL

## 2022-04-25 DEVICE — IMPLANTABLE DEVICE: Type: IMPLANTABLE DEVICE | Status: FUNCTIONAL

## 2022-04-25 DEVICE — SCREW LOKG SLFTP VAR ANG 3.5X87MM: Type: IMPLANTABLE DEVICE | Status: FUNCTIONAL

## 2022-04-25 DEVICE — SCREW CORT S-T 3.5X26MM: Type: IMPLANTABLE DEVICE | Status: FUNCTIONAL

## 2022-04-25 DEVICE — WIRE K THRD 1.6X150MM: Type: IMPLANTABLE DEVICE | Status: FUNCTIONAL

## 2022-04-25 DEVICE — OSTEOSELECT DBM PLUS 5CC: Type: IMPLANTABLE DEVICE | Status: FUNCTIONAL

## 2022-04-25 DEVICE — SCREW LOKG SLFTP VAR ANG 3.5X75MM: Type: IMPLANTABLE DEVICE | Status: FUNCTIONAL

## 2022-04-25 DEVICE — SCREW CORT S-T 3.5X28MM: Type: IMPLANTABLE DEVICE | Status: FUNCTIONAL

## 2022-04-25 RX ORDER — OXYCODONE HYDROCHLORIDE 5 MG/1
5 TABLET ORAL ONCE
Refills: 0 | Status: DISCONTINUED | OUTPATIENT
Start: 2022-04-25 | End: 2022-04-25

## 2022-04-25 RX ORDER — SODIUM CHLORIDE 9 MG/ML
1000 INJECTION, SOLUTION INTRAVENOUS
Refills: 0 | Status: DISCONTINUED | OUTPATIENT
Start: 2022-04-25 | End: 2022-04-25

## 2022-04-25 RX ORDER — HYDROMORPHONE HYDROCHLORIDE 2 MG/ML
0.5 INJECTION INTRAMUSCULAR; INTRAVENOUS; SUBCUTANEOUS EVERY 4 HOURS
Refills: 0 | Status: DISCONTINUED | OUTPATIENT
Start: 2022-04-25 | End: 2022-04-28

## 2022-04-25 RX ORDER — ATORVASTATIN CALCIUM 80 MG/1
20 TABLET, FILM COATED ORAL AT BEDTIME
Refills: 0 | Status: DISCONTINUED | OUTPATIENT
Start: 2022-04-25 | End: 2022-04-28

## 2022-04-25 RX ORDER — ONDANSETRON 8 MG/1
4 TABLET, FILM COATED ORAL ONCE
Refills: 0 | Status: DISCONTINUED | OUTPATIENT
Start: 2022-04-25 | End: 2022-04-25

## 2022-04-25 RX ORDER — OXYCODONE HYDROCHLORIDE 5 MG/1
10 TABLET ORAL EVERY 4 HOURS
Refills: 0 | Status: DISCONTINUED | OUTPATIENT
Start: 2022-04-25 | End: 2022-04-28

## 2022-04-25 RX ORDER — HYDROMORPHONE HYDROCHLORIDE 2 MG/ML
1 INJECTION INTRAMUSCULAR; INTRAVENOUS; SUBCUTANEOUS
Refills: 0 | Status: DISCONTINUED | OUTPATIENT
Start: 2022-04-25 | End: 2022-04-25

## 2022-04-25 RX ORDER — HYDROMORPHONE HYDROCHLORIDE 2 MG/ML
0.5 INJECTION INTRAMUSCULAR; INTRAVENOUS; SUBCUTANEOUS
Refills: 0 | Status: DISCONTINUED | OUTPATIENT
Start: 2022-04-25 | End: 2022-04-25

## 2022-04-25 RX ORDER — FLUTICASONE PROPIONATE 50 MCG
1 SPRAY, SUSPENSION NASAL
Refills: 0 | Status: DISCONTINUED | OUTPATIENT
Start: 2022-04-25 | End: 2022-04-28

## 2022-04-25 RX ORDER — OXYCODONE HYDROCHLORIDE 5 MG/1
5 TABLET ORAL EVERY 4 HOURS
Refills: 0 | Status: DISCONTINUED | OUTPATIENT
Start: 2022-04-25 | End: 2022-04-28

## 2022-04-25 RX ORDER — HYDROMORPHONE HYDROCHLORIDE 2 MG/ML
4 INJECTION INTRAMUSCULAR; INTRAVENOUS; SUBCUTANEOUS EVERY 4 HOURS
Refills: 0 | Status: DISCONTINUED | OUTPATIENT
Start: 2022-04-25 | End: 2022-04-28

## 2022-04-25 RX ORDER — ACETAMINOPHEN 500 MG
975 TABLET ORAL EVERY 8 HOURS
Refills: 0 | Status: DISCONTINUED | OUTPATIENT
Start: 2022-04-25 | End: 2022-04-28

## 2022-04-25 RX ORDER — CEFAZOLIN SODIUM 1 G
2000 VIAL (EA) INJECTION
Refills: 0 | Status: COMPLETED | OUTPATIENT
Start: 2022-04-25 | End: 2022-04-26

## 2022-04-25 RX ORDER — ENOXAPARIN SODIUM 100 MG/ML
40 INJECTION SUBCUTANEOUS EVERY 24 HOURS
Refills: 0 | Status: DISCONTINUED | OUTPATIENT
Start: 2022-04-26 | End: 2022-04-28

## 2022-04-25 RX ADMIN — OXYCODONE HYDROCHLORIDE 10 MILLIGRAM(S): 5 TABLET ORAL at 20:29

## 2022-04-25 RX ADMIN — OXYCODONE HYDROCHLORIDE 10 MILLIGRAM(S): 5 TABLET ORAL at 06:45

## 2022-04-25 RX ADMIN — Medication 975 MILLIGRAM(S): at 21:39

## 2022-04-25 RX ADMIN — ATORVASTATIN CALCIUM 20 MILLIGRAM(S): 80 TABLET, FILM COATED ORAL at 21:39

## 2022-04-25 RX ADMIN — Medication 975 MILLIGRAM(S): at 23:00

## 2022-04-25 RX ADMIN — Medication 1 APPLICATION(S): at 09:38

## 2022-04-25 RX ADMIN — Medication 1 SPRAY(S): at 06:13

## 2022-04-25 RX ADMIN — PANTOPRAZOLE SODIUM 40 MILLIGRAM(S): 20 TABLET, DELAYED RELEASE ORAL at 06:14

## 2022-04-25 RX ADMIN — OXYCODONE HYDROCHLORIDE 10 MILLIGRAM(S): 5 TABLET ORAL at 21:29

## 2022-04-25 RX ADMIN — Medication 975 MILLIGRAM(S): at 06:45

## 2022-04-25 RX ADMIN — Medication 975 MILLIGRAM(S): at 17:58

## 2022-04-25 RX ADMIN — OXYCODONE HYDROCHLORIDE 10 MILLIGRAM(S): 5 TABLET ORAL at 00:27

## 2022-04-25 RX ADMIN — MUPIROCIN 1 APPLICATION(S): 20 OINTMENT TOPICAL at 06:13

## 2022-04-25 RX ADMIN — OXYCODONE HYDROCHLORIDE 10 MILLIGRAM(S): 5 TABLET ORAL at 06:14

## 2022-04-25 RX ADMIN — Medication 100 MILLIGRAM(S): at 18:13

## 2022-04-25 RX ADMIN — Medication 975 MILLIGRAM(S): at 18:58

## 2022-04-25 RX ADMIN — Medication 975 MILLIGRAM(S): at 06:13

## 2022-04-25 NOTE — PROGRESS NOTE ADULT - SUBJECTIVE AND OBJECTIVE BOX
Orthopedic PA Postop Note  Patient S/P RIGHT TIBIAL PLATEAU ORIF  Patient in bed comfortable   RIGHT Leg  Dressing C/D/I - ACE wrap without staining, knee immobilizer in place  DP Pulse intact   Palpable compartments soft and compressible  ** Patient had nerve block-- unable to do motor/sensory at this time    Vital Signs Last 24 Hrs  T(C): 36.8 (25 Apr 2022 15:29), Max: 36.8 (24 Apr 2022 21:21)  T(F): 98.3 (25 Apr 2022 15:29), Max: 98.3 (24 Apr 2022 21:21)  HR: 67 (25 Apr 2022 15:29) (66 - 91)  BP: 148/80 (25 Apr 2022 15:29) (122/72 - 148/80)  BP(mean): 79 (25 Apr 2022 15:13) (79 - 799)  RR: 16 (25 Apr 2022 15:29) (10 - 20)  SpO2: 96% (25 Apr 2022 15:29) (95% - 100%)        A/P:  S/P RIGHT TIBIAL PLATEAU ORIF  1. DVTP - LOVENOX  2. Physical Therapy   3. Pain Control as clinically indicated  Orthopedic PA Postop Note  Patient S/P RIGHT TIBIAL PLATEAU ORIF  Patient in bed comfortable   RIGHT Leg  Dressing C/D/I - ACE wrap without staining, knee immobilizer in place  DP Pulse intact   Palpable compartments soft and compressible  Sensation intact to light touch  +plantar/dorsiflexion/EHL/FHL intact    Vital Signs Last 24 Hrs  T(C): 36.8 (25 Apr 2022 15:29), Max: 36.8 (24 Apr 2022 21:21)  T(F): 98.3 (25 Apr 2022 15:29), Max: 98.3 (24 Apr 2022 21:21)  HR: 67 (25 Apr 2022 15:29) (66 - 91)  BP: 148/80 (25 Apr 2022 15:29) (122/72 - 148/80)  BP(mean): 79 (25 Apr 2022 15:13) (79 - 799)  RR: 16 (25 Apr 2022 15:29) (10 - 20)  SpO2: 96% (25 Apr 2022 15:29) (95% - 100%)        A/P:  S/P RIGHT TIBIAL PLATEAU ORIF  1. DVTP - LOVENOX  2. Physical Therapy   3. Pain Control as clinically indicated

## 2022-04-25 NOTE — BRIEF OPERATIVE NOTE - COMMENTS
post-op plan: NWB, full ROM, PT/OT, ancef per protocol, contralateral SCD, LMWH (or equivalent) x 4 weeks post-op

## 2022-04-25 NOTE — PROGRESS NOTE ADULT - ASSESSMENT
Patient is a 56y Female with PMH of HLD, Acid Reflux , history of obesity , s/p Lap band 2009 , complicated with aspiration PNA and lung abscess in 2020 , was hospitalized and treated with iv Abx x 6 wks ( s/p PICC line placement ) , follows regularly with Pulmonologist, hx of small lungs nodules  . Last CT chest done 3/30 /22 - read as new small reticular infiltrates in the R middle lobe, minimally enlarged R lung nodules as well new small nodules < 5 mm. Patient presented presenting to the emergency department with a chief complaint of RLE pain x today, s/p mechanical fall. Patient states she tripped and fell off her porch this AM. Denies numbness/tingling. No LOC , admits to chronic sob  on exertion but no chest pain .     1. R proximal tibia fracture post mechanical fall, no LOC   compartment checks q4h  plan for OR today with Dr. Gamez   - pain mgmt   - post op PT/OT once ok from the Ortho   DVT prophylaxis as per primary team   bowel meds     2.Hx of HLD - continue home dose of statins     3. Hx of Acid Reflux - continue PPI    4. Hx of small lung nodules/ chronic sob / hx of Aspiration PNA / abccess   in 2020 - treated , follows with Pulmonologist ,presently no sob , O2 sat on RA 98%, CXR - no acute pathology     5. Leucocytosis - no S/S of infection - likely reactive due to fracture, now resolved     6. BP elevated initially without hx of HTN - likely due to pain    repeated well controlled , may use Hydralazine ivp 10 MG prn Q6 hrs for SBP > 170, BP has been stable now    7. DVT prophylaxis  - as per ortho protocol  Opioid induced constipation  prophylaxis - start  bowel regimen postop     8. Nausea - after pain meds / opioids - added Zofran, feeling better     Labs / EKG / CXR - reviewed , patient has no Hx of CAD/CHF ,   hx of chronic sob on exertion after lung abscess, no chest pain .    Medically  optimized for planned procedure .   Will follow along with you .

## 2022-04-25 NOTE — BRIEF OPERATIVE NOTE - NSICDXBRIEFPROCEDURE_GEN_ALL_CORE_FT
PROCEDURES:  Open reduction and internal fixation (ORIF) of fracture of tibial plateau with C-arm fluoroscopic guidance 25-Apr-2022 14:09:17  Silas Hendricks

## 2022-04-25 NOTE — BRIEF OPERATIVE NOTE - OPERATION/FINDINGS
Phone call placed to speak to mother who has not yet shown up for 1 pm office visit. Voicemail left that this was an important visit and Leonardo needs to be seen today.     Electronically signed by JOSE ALFREDO Strauss on 10/6/2017 at 1:44 PM
Right tibial plateau fracture

## 2022-04-25 NOTE — DISCHARGE NOTE PROVIDER - NSDCCPCAREPLAN_GEN_ALL_CORE_FT
PRINCIPAL DISCHARGE DIAGNOSIS  Diagnosis: Closed fracture of proximal end of right tibia  Assessment and Plan of Treatment:

## 2022-04-25 NOTE — DISCHARGE NOTE PROVIDER - CARE PROVIDER_API CALL
Harpreet Gamez)  Orthopaedic Surgery  [address] [place] [address]  Phone: [phone]  Fax: [phone]  Follow Up Time:

## 2022-04-25 NOTE — DISCHARGE NOTE PROVIDER - NSDCFUADDINST_GEN_ALL_CORE_FT
The patient will be seen in the office between 1-2 weeks for wound check. Sutures/Staples will be removed at that time. Patient may NOT shower until after re-evaluation in the office. The patient will continue with knee immobilizer as applied in hospital and not remove until re-evaluation in the office. The patient will contact the office if the wound becomes red, has increasing pain, develops bleeding or discharge, an injury occurs, or has other concerns. The patient will continue LOVENOX for 4 weeks. The patient will take oxycodone for pain control and titrate according to prescription and patient needs. The patient is NON-weight bearing on the right lower extremity. The patient is recommended to elevated the affected extremity to reduce swelling.

## 2022-04-25 NOTE — ASU PREOP CHECKLIST - AICD PRESENT
[Alert] : alert [Healthy Appearance] : healthy appearance [No Proptosis] : no proptosis [No Lid Lag] : no lid lag [Normal Hearing] : hearing was normal [Clear to Auscultation] : lungs were clear to auscultation bilaterally [Normal S1, S2] : normal S1 and S2 no [Regular Rhythm] : with a regular rhythm [No Edema] : no peripheral edema [Pedal Pulses Normal] : the pedal pulses are present [de-identified] : thyroid palpable, soft, nodular

## 2022-04-25 NOTE — DISCHARGE NOTE PROVIDER - NSDCMRMEDTOKEN_GEN_ALL_CORE_FT
Lipitor 20 mg oral tablet: 1 tab(s) orally once a day  Protonix 40 mg oral delayed release tablet: 1 tab(s) orally once a day   acetaminophen 325 mg oral tablet: 3 tab(s) orally every 8 hours  Lipitor 20 mg oral tablet: 1 tab(s) orally once a day  Lovenox 40 mg/0.4 mL injectable solution: 1 dose(s) subcutaneously once a day   oxyCODONE 5 mg oral tablet: 1 tab(s) orally every 4 hours, As Needed for moderate to severe pain. MDD:6  Protonix 40 mg oral delayed release tablet: 1 tab(s) orally once a day  Senna S 50 mg-8.6 mg oral tablet: 2 tab(s) orally once a day (at bedtime), As Needed

## 2022-04-25 NOTE — BRIEF OPERATIVE NOTE - NSICDXBRIEFPREOP_GEN_ALL_CORE_FT
PRE-OP DIAGNOSIS:  Closed bicondylar fracture of right tibial plateau 25-Apr-2022 14:09:34  Silas Hendricks

## 2022-04-25 NOTE — DISCHARGE NOTE PROVIDER - HOSPITAL COURSE
The patient underwent a Right OPEN REDUCTION AND INTERNAL FIXATION on 4/24/22 for treatment of a tibial plateau fracture. The patient received antibiotics consistent with SCIP guidelines. The patient was medically cleared and underwent the procedure and had no intra-operative complications. Post-operatively, the patient was seen by medicine and PT. The patient received LOVENOX for DVTP. The patient received pain medications per orthopedic pain management protocol and the pain was appropriately controlled. Patient was evaluated by PT and instructed on gait training. The patient was NON-weight bearing. The patient did not have any post-operative medical complications. The patient was discharged in stable condition.

## 2022-04-25 NOTE — DISCHARGE NOTE PROVIDER - NSDCCPTREATMENT_GEN_ALL_CORE_FT
PRINCIPAL PROCEDURE  Procedure: Open reduction and internal fixation (ORIF) of fracture of tibial plateau with C-arm fluoroscopic guidance  Findings and Treatment:

## 2022-04-25 NOTE — PROGRESS NOTE ADULT - SUBJECTIVE AND OBJECTIVE BOX
EARNESTINE BERNSTEIN    [id number]    56y      Female    Patient is a 56y old  Female who presents with a chief complaint of right prox tib fx (24 Apr 2022 22:50)      INTERVAL HPI/OVERNIGHT EVENTS:      Patients pain is well controlled with pain meds, denies fever, chills, chest pain, no nausea or vomiting      REVIEW OF SYSTEMS:    CONSTITUTIONAL: No fever, fatigue  RESPIRATORY: No cough, No shortness of breath  CARDIOVASCULAR: No chest pain, palpitations  GASTROINTESTINAL: No abdominal, No nausea, vomiting  NEUROLOGICAL: No headaches,  loss of strength.  MISCELLANEOUS: No joint swelling or pain        Vital Signs Last 24 Hrs  T(C): 36.6 (25 Apr 2022 08:34), Max: 37.1 (24 Apr 2022 17:38)  T(F): 97.8 (25 Apr 2022 08:34), Max: 98.7 (24 Apr 2022 17:38)  HR: 66 (25 Apr 2022 08:34) (59 - 69)  BP: 148/76 (25 Apr 2022 08:34) (103/66 - 148/76)  RR: 18 (25 Apr 2022 08:34) (18 - 18)  SpO2: 95% (25 Apr 2022 08:34) (94% - 98%)    PHYSICAL EXAM:    GENERAL: Middle age female looking comfortable   HEENT: PERRL, +EOMI  NECK: soft, Supple, No JVD,   CHEST/LUNG: Clear to auscultate bilaterally; No wheezing  HEART: S1S2+, Regular rate and rhythm; No murmurs  ABDOMEN: Soft, Nontender, Nondistended; Bowel sounds present  EXTREMITIES:  1+ Peripheral Pulses, No edema, right leg decrease rom due to pain   SKIN: No rashes or lesions  NEURO: AAOX3, no focal deficits, no motor r sensory loss  PSYCH: normal mood      LABS:                        10.9   9.32  )-----------( 298      ( 25 Apr 2022 06:28 )             34.5     04-25    141  |  102  |  12.9  ----------------------------<  136<H>  4.0   |  25.0  |  0.82    Ca    8.8      25 Apr 2022 06:28              I&O's Summary    24 Apr 2022 07:01  - 25 Apr 2022 07:00  --------------------------------------------------------  IN: 0 mL / OUT: 2600 mL / NET: -2600 mL        MEDICATIONS  (STANDING):  acetaminophen     Tablet .. 975 milliGRAM(s) Oral every 8 hours  atorvastatin 20 milliGRAM(s) Oral at bedtime  fluticasone propionate 50 MICROgram(s)/spray Nasal Spray 1 Spray(s) Both Nostrils two times a day  mupirocin 2% Ointment 1 Application(s) Both Nostrils two times a day  pantoprazole    Tablet 40 milliGRAM(s) Oral before breakfast  povidone iodine 5% Nasal Swab 1 Application(s) Both Nostrils once    MEDICATIONS  (PRN):  HYDROmorphone  Injectable 0.5 milliGRAM(s) IV Push every 4 hours PRN breakthrough pain  ondansetron Injectable 4 milliGRAM(s) IV Push every 4 hours PRN Nausea and/or Vomiting  oxyCODONE    IR 5 milliGRAM(s) Oral every 3 hours PRN Mild Pain (1 - 3)  oxyCODONE    IR 10 milliGRAM(s) Oral every 3 hours PRN Moderate Pain (4 - 6)

## 2022-04-26 LAB
ANION GAP SERPL CALC-SCNC: 10 MMOL/L — SIGNIFICANT CHANGE UP (ref 5–17)
BASOPHILS # BLD AUTO: 0.02 K/UL — SIGNIFICANT CHANGE UP (ref 0–0.2)
BASOPHILS NFR BLD AUTO: 0.1 % — SIGNIFICANT CHANGE UP (ref 0–2)
BUN SERPL-MCNC: 13.9 MG/DL — SIGNIFICANT CHANGE UP (ref 8–20)
CALCIUM SERPL-MCNC: 8.7 MG/DL — SIGNIFICANT CHANGE UP (ref 8.6–10.2)
CHLORIDE SERPL-SCNC: 101 MMOL/L — SIGNIFICANT CHANGE UP (ref 98–107)
CO2 SERPL-SCNC: 26 MMOL/L — SIGNIFICANT CHANGE UP (ref 22–29)
CREAT SERPL-MCNC: 0.74 MG/DL — SIGNIFICANT CHANGE UP (ref 0.5–1.3)
EGFR: 94 ML/MIN/1.73M2 — SIGNIFICANT CHANGE UP
EOSINOPHIL # BLD AUTO: 0.01 K/UL — SIGNIFICANT CHANGE UP (ref 0–0.5)
EOSINOPHIL NFR BLD AUTO: 0.1 % — SIGNIFICANT CHANGE UP (ref 0–6)
GLUCOSE SERPL-MCNC: 139 MG/DL — HIGH (ref 70–99)
HCT VFR BLD CALC: 33.2 % — LOW (ref 34.5–45)
HGB BLD-MCNC: 10.4 G/DL — LOW (ref 11.5–15.5)
IMM GRANULOCYTES NFR BLD AUTO: 0.7 % — SIGNIFICANT CHANGE UP (ref 0–1.5)
LYMPHOCYTES # BLD AUTO: 1.99 K/UL — SIGNIFICANT CHANGE UP (ref 1–3.3)
LYMPHOCYTES # BLD AUTO: 14.6 % — SIGNIFICANT CHANGE UP (ref 13–44)
MCHC RBC-ENTMCNC: 28.1 PG — SIGNIFICANT CHANGE UP (ref 27–34)
MCHC RBC-ENTMCNC: 31.3 GM/DL — LOW (ref 32–36)
MCV RBC AUTO: 89.7 FL — SIGNIFICANT CHANGE UP (ref 80–100)
MONOCYTES # BLD AUTO: 0.83 K/UL — SIGNIFICANT CHANGE UP (ref 0–0.9)
MONOCYTES NFR BLD AUTO: 6.1 % — SIGNIFICANT CHANGE UP (ref 2–14)
NEUTROPHILS # BLD AUTO: 10.68 K/UL — HIGH (ref 1.8–7.4)
NEUTROPHILS NFR BLD AUTO: 78.4 % — HIGH (ref 43–77)
PLATELET # BLD AUTO: 319 K/UL — SIGNIFICANT CHANGE UP (ref 150–400)
POTASSIUM SERPL-MCNC: 4.1 MMOL/L — SIGNIFICANT CHANGE UP (ref 3.5–5.3)
POTASSIUM SERPL-SCNC: 4.1 MMOL/L — SIGNIFICANT CHANGE UP (ref 3.5–5.3)
RBC # BLD: 3.7 M/UL — LOW (ref 3.8–5.2)
RBC # FLD: 13.2 % — SIGNIFICANT CHANGE UP (ref 10.3–14.5)
SODIUM SERPL-SCNC: 137 MMOL/L — SIGNIFICANT CHANGE UP (ref 135–145)
WBC # BLD: 13.63 K/UL — HIGH (ref 3.8–10.5)
WBC # FLD AUTO: 13.63 K/UL — HIGH (ref 3.8–10.5)

## 2022-04-26 PROCEDURE — 99232 SBSQ HOSP IP/OBS MODERATE 35: CPT

## 2022-04-26 RX ORDER — ONDANSETRON 8 MG/1
4 TABLET, FILM COATED ORAL ONCE
Refills: 0 | Status: COMPLETED | OUTPATIENT
Start: 2022-04-26 | End: 2022-04-26

## 2022-04-26 RX ADMIN — HYDROMORPHONE HYDROCHLORIDE 0.5 MILLIGRAM(S): 2 INJECTION INTRAMUSCULAR; INTRAVENOUS; SUBCUTANEOUS at 04:00

## 2022-04-26 RX ADMIN — OXYCODONE HYDROCHLORIDE 10 MILLIGRAM(S): 5 TABLET ORAL at 05:38

## 2022-04-26 RX ADMIN — HYDROMORPHONE HYDROCHLORIDE 4 MILLIGRAM(S): 2 INJECTION INTRAMUSCULAR; INTRAVENOUS; SUBCUTANEOUS at 10:36

## 2022-04-26 RX ADMIN — HYDROMORPHONE HYDROCHLORIDE 0.5 MILLIGRAM(S): 2 INJECTION INTRAMUSCULAR; INTRAVENOUS; SUBCUTANEOUS at 04:12

## 2022-04-26 RX ADMIN — OXYCODONE HYDROCHLORIDE 10 MILLIGRAM(S): 5 TABLET ORAL at 18:31

## 2022-04-26 RX ADMIN — HYDROMORPHONE HYDROCHLORIDE 0.5 MILLIGRAM(S): 2 INJECTION INTRAMUSCULAR; INTRAVENOUS; SUBCUTANEOUS at 08:02

## 2022-04-26 RX ADMIN — Medication 975 MILLIGRAM(S): at 15:03

## 2022-04-26 RX ADMIN — Medication 975 MILLIGRAM(S): at 22:03

## 2022-04-26 RX ADMIN — ONDANSETRON 4 MILLIGRAM(S): 8 TABLET, FILM COATED ORAL at 10:11

## 2022-04-26 RX ADMIN — HYDROMORPHONE HYDROCHLORIDE 0.5 MILLIGRAM(S): 2 INJECTION INTRAMUSCULAR; INTRAVENOUS; SUBCUTANEOUS at 20:42

## 2022-04-26 RX ADMIN — HYDROMORPHONE HYDROCHLORIDE 0.5 MILLIGRAM(S): 2 INJECTION INTRAMUSCULAR; INTRAVENOUS; SUBCUTANEOUS at 16:18

## 2022-04-26 RX ADMIN — Medication 975 MILLIGRAM(S): at 23:00

## 2022-04-26 RX ADMIN — OXYCODONE HYDROCHLORIDE 10 MILLIGRAM(S): 5 TABLET ORAL at 23:28

## 2022-04-26 RX ADMIN — OXYCODONE HYDROCHLORIDE 10 MILLIGRAM(S): 5 TABLET ORAL at 06:38

## 2022-04-26 RX ADMIN — OXYCODONE HYDROCHLORIDE 10 MILLIGRAM(S): 5 TABLET ORAL at 02:23

## 2022-04-26 RX ADMIN — Medication 100 MILLIGRAM(S): at 02:07

## 2022-04-26 RX ADMIN — HYDROMORPHONE HYDROCHLORIDE 0.5 MILLIGRAM(S): 2 INJECTION INTRAMUSCULAR; INTRAVENOUS; SUBCUTANEOUS at 11:09

## 2022-04-26 RX ADMIN — Medication 975 MILLIGRAM(S): at 06:00

## 2022-04-26 RX ADMIN — HYDROMORPHONE HYDROCHLORIDE 0.5 MILLIGRAM(S): 2 INJECTION INTRAMUSCULAR; INTRAVENOUS; SUBCUTANEOUS at 20:27

## 2022-04-26 RX ADMIN — OXYCODONE HYDROCHLORIDE 10 MILLIGRAM(S): 5 TABLET ORAL at 22:41

## 2022-04-26 RX ADMIN — Medication 975 MILLIGRAM(S): at 14:03

## 2022-04-26 RX ADMIN — HYDROMORPHONE HYDROCHLORIDE 4 MILLIGRAM(S): 2 INJECTION INTRAMUSCULAR; INTRAVENOUS; SUBCUTANEOUS at 14:04

## 2022-04-26 RX ADMIN — HYDROMORPHONE HYDROCHLORIDE 4 MILLIGRAM(S): 2 INJECTION INTRAMUSCULAR; INTRAVENOUS; SUBCUTANEOUS at 09:36

## 2022-04-26 RX ADMIN — Medication 975 MILLIGRAM(S): at 05:34

## 2022-04-26 RX ADMIN — Medication 1 SPRAY(S): at 05:33

## 2022-04-26 RX ADMIN — HYDROMORPHONE HYDROCHLORIDE 0.5 MILLIGRAM(S): 2 INJECTION INTRAMUSCULAR; INTRAVENOUS; SUBCUTANEOUS at 11:24

## 2022-04-26 RX ADMIN — ATORVASTATIN CALCIUM 20 MILLIGRAM(S): 80 TABLET, FILM COATED ORAL at 22:03

## 2022-04-26 RX ADMIN — HYDROMORPHONE HYDROCHLORIDE 0.5 MILLIGRAM(S): 2 INJECTION INTRAMUSCULAR; INTRAVENOUS; SUBCUTANEOUS at 08:16

## 2022-04-26 RX ADMIN — ENOXAPARIN SODIUM 40 MILLIGRAM(S): 100 INJECTION SUBCUTANEOUS at 05:33

## 2022-04-26 RX ADMIN — HYDROMORPHONE HYDROCHLORIDE 4 MILLIGRAM(S): 2 INJECTION INTRAMUSCULAR; INTRAVENOUS; SUBCUTANEOUS at 15:04

## 2022-04-26 RX ADMIN — OXYCODONE HYDROCHLORIDE 10 MILLIGRAM(S): 5 TABLET ORAL at 01:23

## 2022-04-26 NOTE — PHYSICAL THERAPY INITIAL EVALUATION ADULT - GAIT TRAINING, PT EVAL
Time Frame: 2-3  days   Goal:   Modified Independent with RW x 10 feet / Stairs: N/A, pt would benefit from a ramp to enter and exit premesis

## 2022-04-26 NOTE — PHYSICAL THERAPY INITIAL EVALUATION ADULT - ADDITIONAL COMMENTS
Pt lives in a house with 4  steps to enter with 0 rails and  12 stairs inside with  rails.  Pt owns medical equipment: none   Pt lives with: spouse  Someone is always available to provide assist.

## 2022-04-26 NOTE — PHYSICAL THERAPY INITIAL EVALUATION ADULT - DID THE PATIENT HAVE SURGERY?
s/p Open reduction and internal fixation (ORIF) of fracture of tibial plateau with C-arm fluoroscopic guidance/yes

## 2022-04-26 NOTE — PHYSICAL THERAPY INITIAL EVALUATION ADULT - DISCHARGE DISPOSITION, PT EVAL
home w/ assist/home w/ home PT CHASIDY or home with Ramp if patient chooses home/home w/ assist/home w/ home PT/rehabilitation facility

## 2022-04-26 NOTE — CHART NOTE - NSCHARTNOTEFT_GEN_A_CORE
as per PT recommendations:  Patient can not use cane or RW. Patient needs wc for ADLs in home due to inability to bear weight RLE  pt can self propel wheelchari and able to use in home  pt needs ELR due to lower extermity surgery

## 2022-04-26 NOTE — PHYSICAL THERAPY INITIAL EVALUATION ADULT - ACTIVE RANGE OF MOTION EXAMINATION, REHAB EVAL
Right LE in knee immobilizer/irma. upper extremity Active ROM was WNL (within normal limits)/Left LE Active ROM was WFL (within functional limits)/deficits as listed below

## 2022-04-26 NOTE — PROGRESS NOTE ADULT - ASSESSMENT
56y Female with PMH of HLD, Acid Reflux , history of obesity , s/p Lap band 2009 , complicated with aspiration PNA and lung abscess in 2020 , was hospitalized and treated with iv Abx x 6 wks ( s/p PICC line placement ) , follows regularly with Pulmonologist, hx of small lungs nodules presenting to the emergency department s/p mechanical fall found to have right proximal tibia fracture and s/p ORIF via ortho.    #R proximal tibia fracture   - s/p post mechanical fall  - s/p right tibial plateau ORIF  POD 1  - pain mgmt  - local wound care  - supportive care  - pt   - primary mgmt per ortho    #HLD   - statin    #History of small lung nodules/ chronic sob / hx of Aspiration PNA / abccess (in 2020)  - previously treated- follows with pulm outpatient - currently with no sob and on room air - cxr- no acute pathology    #Leukocytosis   - likely reactive patient afebrile vitals stable  - no signs or symptoms of infection at this time   - monitor wbc    #DVT prophylaxis  - lovenox SC    patient medically optimized for discharge per primary team

## 2022-04-26 NOTE — PROGRESS NOTE ADULT - SUBJECTIVE AND OBJECTIVE BOX
Ortho Progress note    Name: EARNESTINE BERNSTEIN    MR #: [id number]    Procedure: right tibial plateau ORIF  Surgeon: Dr. Gamez    Pt comfortable without complaints, pain controlled  RLE in knee immobilizer and elevated  Denies CP, SOB, N/V, numbness/tingling     General Exam:  Vital Signs Last 24 Hrs  T(C): 36.8 (04-26-22 @ 04:31), Max: 36.8 (04-26-22 @ 04:31)  T(F): 98.2 (04-26-22 @ 04:31), Max: 98.2 (04-26-22 @ 04:31)  HR: 70 (04-26-22 @ 04:31) (70 - 70)  BP: 116/59 (04-26-22 @ 04:31) (116/59 - 116/59)  BP(mean): --  RR: 16 (04-26-22 @ 04:31) (16 - 16)  SpO2: 88% (04-26-22 @ 04:31) (88% - 88%)    General: Pt Alert and oriented, NAD, controlled pain.  RLE Dressing C/D/I.   Knee immobilizer in place  Pulses: 2+ dorsalis pedis pulse. Cap refill < 2 sec.  Sensation: Grossly intact to light touch without deficit.  Motor: + EHL/FHL/TA/GS    A/P: 56yFemale POD#1 s/p right tibial plateau ORIF    - Pain Control  - DVT ppx: Lovenox 40 daily  - PT   - Weight bearing status: NWB RLE  - Continue Ice/ Elevation  - DC planning

## 2022-04-26 NOTE — PROGRESS NOTE ADULT - SUBJECTIVE AND OBJECTIVE BOX
Patient is a 56y old  Female who presents with a chief complaint of right prox tib fx (26 Apr 2022 07:50)    Patient seen and examined at bedside.     ALLERGIES:  No Known Allergies    MEDICATIONS  (STANDING):  acetaminophen     Tablet .. 975 milliGRAM(s) Oral every 8 hours  atorvastatin 20 milliGRAM(s) Oral at bedtime  enoxaparin Injectable 40 milliGRAM(s) SubCutaneous every 24 hours  fluticasone propionate 50 MICROgram(s)/spray Nasal Spray 1 Spray(s) Both Nostrils two times a day  ondansetron Injectable 4 milliGRAM(s) IV Push once    MEDICATIONS  (PRN):  HYDROmorphone   Tablet 4 milliGRAM(s) Oral every 4 hours PRN Severe Pain (7 - 10)  HYDROmorphone  Injectable 0.5 milliGRAM(s) IV Push every 4 hours PRN breakthrough pain  oxyCODONE    IR 10 milliGRAM(s) Oral every 4 hours PRN Moderate Pain (4 - 6)  oxyCODONE    IR 5 milliGRAM(s) Oral every 4 hours PRN Mild Pain (1 - 3)    Vital Signs Last 24 Hrs  T(F): 99 (26 Apr 2022 08:35), Max: 99 (25 Apr 2022 21:16)  HR: 67 (26 Apr 2022 08:35) (67 - 91)  BP: 151/83 (26 Apr 2022 08:35) (112/62 - 151/83)  RR: 18 (26 Apr 2022 08:35) (10 - 19)  SpO2: 95% (26 Apr 2022 08:35) (88% - 100%)  I&O's Summary    25 Apr 2022 07:01  -  26 Apr 2022 07:00  --------------------------------------------------------  IN: 0 mL / OUT: 1400 mL / NET: -1400 mL    26 Apr 2022 07:01  -  26 Apr 2022 10:02  --------------------------------------------------------  IN: 360 mL / OUT: 1000 mL / NET: -640 mL      PHYSICAL EXAM:  General: NAD, A/O x 3  ENT: MMM, no thrush  Neck: Supple, No JVD  Lungs: Clear to auscultation bilaterally, good air entry, non-labored breathing  Cardio: +s1/s2  Abdomen: Soft, Nontender, Nondistended; Bowel sounds present  Extremities: No calf tenderness, No pitting edema    LABS:                        10.4   13.63 )-----------( 319      ( 26 Apr 2022 07:20 )             33.2     04-26    137  |  101  |  13.9  ----------------------------<  139  4.1   |  26.0  |  0.74    Ca    8.7      26 Apr 2022 07:20    RADIOLOGY & ADDITIONAL TESTS:  < from: Xray Tibia + Fibula 2 Views, Right (04.25.22 @ 15:04) >  IMPRESSION: Status post open reduction internal fixation of a comminuted   intra-articular fracture of the proximal tibia along with a nondisplaced   comminuted fracture of the proximal fibula, all improved in alignment   compared to preoperative radiographs..  < end of copied text >    Care Discussed with Consultants/Other Providers:   Orthopedic Surgery

## 2022-04-27 LAB
ANION GAP SERPL CALC-SCNC: 10 MMOL/L — SIGNIFICANT CHANGE UP (ref 5–17)
BASOPHILS # BLD AUTO: 0.07 K/UL — SIGNIFICANT CHANGE UP (ref 0–0.2)
BASOPHILS NFR BLD AUTO: 0.5 % — SIGNIFICANT CHANGE UP (ref 0–2)
BUN SERPL-MCNC: 14.4 MG/DL — SIGNIFICANT CHANGE UP (ref 8–20)
CALCIUM SERPL-MCNC: 9.1 MG/DL — SIGNIFICANT CHANGE UP (ref 8.6–10.2)
CHLORIDE SERPL-SCNC: 100 MMOL/L — SIGNIFICANT CHANGE UP (ref 98–107)
CO2 SERPL-SCNC: 27 MMOL/L — SIGNIFICANT CHANGE UP (ref 22–29)
CREAT SERPL-MCNC: 0.77 MG/DL — SIGNIFICANT CHANGE UP (ref 0.5–1.3)
EGFR: 90 ML/MIN/1.73M2 — SIGNIFICANT CHANGE UP
EOSINOPHIL # BLD AUTO: 0.29 K/UL — SIGNIFICANT CHANGE UP (ref 0–0.5)
EOSINOPHIL NFR BLD AUTO: 2.2 % — SIGNIFICANT CHANGE UP (ref 0–6)
GLUCOSE SERPL-MCNC: 121 MG/DL — HIGH (ref 70–99)
HCT VFR BLD CALC: 33.4 % — LOW (ref 34.5–45)
HGB BLD-MCNC: 10.3 G/DL — LOW (ref 11.5–15.5)
IMM GRANULOCYTES NFR BLD AUTO: 0.9 % — SIGNIFICANT CHANGE UP (ref 0–1.5)
LYMPHOCYTES # BLD AUTO: 32 % — SIGNIFICANT CHANGE UP (ref 13–44)
LYMPHOCYTES # BLD AUTO: 4.22 K/UL — HIGH (ref 1–3.3)
MCHC RBC-ENTMCNC: 28.1 PG — SIGNIFICANT CHANGE UP (ref 27–34)
MCHC RBC-ENTMCNC: 30.8 GM/DL — LOW (ref 32–36)
MCV RBC AUTO: 91.3 FL — SIGNIFICANT CHANGE UP (ref 80–100)
MONOCYTES # BLD AUTO: 1.03 K/UL — HIGH (ref 0–0.9)
MONOCYTES NFR BLD AUTO: 7.8 % — SIGNIFICANT CHANGE UP (ref 2–14)
NEUTROPHILS # BLD AUTO: 7.46 K/UL — HIGH (ref 1.8–7.4)
NEUTROPHILS NFR BLD AUTO: 56.6 % — SIGNIFICANT CHANGE UP (ref 43–77)
PLATELET # BLD AUTO: 343 K/UL — SIGNIFICANT CHANGE UP (ref 150–400)
POTASSIUM SERPL-MCNC: 4.7 MMOL/L — SIGNIFICANT CHANGE UP (ref 3.5–5.3)
POTASSIUM SERPL-SCNC: 4.7 MMOL/L — SIGNIFICANT CHANGE UP (ref 3.5–5.3)
RBC # BLD: 3.66 M/UL — LOW (ref 3.8–5.2)
RBC # FLD: 13.4 % — SIGNIFICANT CHANGE UP (ref 10.3–14.5)
SODIUM SERPL-SCNC: 137 MMOL/L — SIGNIFICANT CHANGE UP (ref 135–145)
WBC # BLD: 13.19 K/UL — HIGH (ref 3.8–10.5)
WBC # FLD AUTO: 13.19 K/UL — HIGH (ref 3.8–10.5)

## 2022-04-27 PROCEDURE — 99232 SBSQ HOSP IP/OBS MODERATE 35: CPT

## 2022-04-27 RX ORDER — SENNOSIDES/DOCUSATE SODIUM 8.6MG-50MG
2 TABLET ORAL
Qty: 20 | Refills: 0
Start: 2022-04-27 | End: 2022-05-06

## 2022-04-27 RX ORDER — OXYCODONE HYDROCHLORIDE 5 MG/1
1 TABLET ORAL
Qty: 30 | Refills: 0
Start: 2022-04-27 | End: 2022-05-01

## 2022-04-27 RX ORDER — ENOXAPARIN SODIUM 100 MG/ML
1 INJECTION SUBCUTANEOUS
Qty: 28 | Refills: 0
Start: 2022-04-27 | End: 2022-05-24

## 2022-04-27 RX ORDER — ACETAMINOPHEN 500 MG
3 TABLET ORAL
Qty: 0 | Refills: 0 | DISCHARGE
Start: 2022-04-27

## 2022-04-27 RX ADMIN — HYDROMORPHONE HYDROCHLORIDE 0.5 MILLIGRAM(S): 2 INJECTION INTRAMUSCULAR; INTRAVENOUS; SUBCUTANEOUS at 14:29

## 2022-04-27 RX ADMIN — HYDROMORPHONE HYDROCHLORIDE 0.5 MILLIGRAM(S): 2 INJECTION INTRAMUSCULAR; INTRAVENOUS; SUBCUTANEOUS at 14:45

## 2022-04-27 RX ADMIN — OXYCODONE HYDROCHLORIDE 10 MILLIGRAM(S): 5 TABLET ORAL at 04:30

## 2022-04-27 RX ADMIN — Medication 1 SPRAY(S): at 17:02

## 2022-04-27 RX ADMIN — HYDROMORPHONE HYDROCHLORIDE 0.5 MILLIGRAM(S): 2 INJECTION INTRAMUSCULAR; INTRAVENOUS; SUBCUTANEOUS at 08:54

## 2022-04-27 RX ADMIN — ENOXAPARIN SODIUM 40 MILLIGRAM(S): 100 INJECTION SUBCUTANEOUS at 05:03

## 2022-04-27 RX ADMIN — OXYCODONE HYDROCHLORIDE 10 MILLIGRAM(S): 5 TABLET ORAL at 21:35

## 2022-04-27 RX ADMIN — OXYCODONE HYDROCHLORIDE 10 MILLIGRAM(S): 5 TABLET ORAL at 10:58

## 2022-04-27 RX ADMIN — OXYCODONE HYDROCHLORIDE 10 MILLIGRAM(S): 5 TABLET ORAL at 11:49

## 2022-04-27 RX ADMIN — OXYCODONE HYDROCHLORIDE 10 MILLIGRAM(S): 5 TABLET ORAL at 03:34

## 2022-04-27 RX ADMIN — OXYCODONE HYDROCHLORIDE 10 MILLIGRAM(S): 5 TABLET ORAL at 17:40

## 2022-04-27 RX ADMIN — Medication 975 MILLIGRAM(S): at 13:34

## 2022-04-27 RX ADMIN — OXYCODONE HYDROCHLORIDE 10 MILLIGRAM(S): 5 TABLET ORAL at 22:00

## 2022-04-27 RX ADMIN — Medication 975 MILLIGRAM(S): at 22:00

## 2022-04-27 RX ADMIN — Medication 975 MILLIGRAM(S): at 05:04

## 2022-04-27 RX ADMIN — OXYCODONE HYDROCHLORIDE 10 MILLIGRAM(S): 5 TABLET ORAL at 17:02

## 2022-04-27 RX ADMIN — Medication 975 MILLIGRAM(S): at 06:15

## 2022-04-27 RX ADMIN — Medication 975 MILLIGRAM(S): at 21:35

## 2022-04-27 RX ADMIN — Medication 975 MILLIGRAM(S): at 14:24

## 2022-04-27 RX ADMIN — ATORVASTATIN CALCIUM 20 MILLIGRAM(S): 80 TABLET, FILM COATED ORAL at 21:35

## 2022-04-27 RX ADMIN — Medication 1 SPRAY(S): at 05:09

## 2022-04-27 RX ADMIN — HYDROMORPHONE HYDROCHLORIDE 0.5 MILLIGRAM(S): 2 INJECTION INTRAMUSCULAR; INTRAVENOUS; SUBCUTANEOUS at 07:37

## 2022-04-27 NOTE — PROGRESS NOTE ADULT - ASSESSMENT
56y Female with PMH of HLD, Acid Reflux , history of obesity , s/p Lap band 2009 , complicated with aspiration PNA and lung abscess in 2020 , was hospitalized and treated with iv Abx x 6 wks ( s/p PICC line placement ) , follows regularly with Pulmonologist, hx of small lungs nodules presenting to the emergency department s/p mechanical fall found to have right proximal tibia fracture and s/p ORIF via ortho.    #R proximal tibia fracture   - s/p post mechanical fall  - s/p right tibial plateau ORIF  POD 2  - pain mgmt  - local wound care  - supportive care  - pt   - primary mgmt per ortho    #HLD   - statin    #History of small lung nodules/ chronic sob / hx of Aspiration PNA / abccess (in 2020)  - previously treated- follows with pulm outpatient - currently with no sob and on room air - cxr- no acute pathology    #Leukocytosis   - likely reactive patient afebrile vitals stable  - no signs or symptoms of infection at this time   - monitor wbc    #DVT prophylaxis  - lovenox SC    patient medically optimized for discharge per primary team

## 2022-04-27 NOTE — PROGRESS NOTE ADULT - SUBJECTIVE AND OBJECTIVE BOX
ORTHOPEDIC POST-OP PROGRESS NOTE:  Name: EARNESTINE BERNSTEIN    MR #: [id number]    Procedure: Right tibial plateau ORIF   Surgeon: Dr. Gamez  DOS: 4/25      Pt seen and examined. Pt comfortable without complaints, complaining of pain - patient states that pain has been better and that oxycodone has been helping her. Participating in PT. Denies CP, SOB, N/V, numbness/tingling. Denies acute motor or sensory changes.       Vital Signs Last 24 Hrs  T(C): 36.9 (04-27-22 @ 04:30), Max: 36.9 (04-27-22 @ 04:30)  T(F): 98.4 (04-27-22 @ 04:30), Max: 98.4 (04-27-22 @ 04:30)  HR: 67 (04-27-22 @ 04:30) (67 - 67)  BP: 125/73 (04-27-22 @ 04:30) (125/73 - 125/73)  BP(mean): --  RR: 18 (04-27-22 @ 04:30) (18 - 18)  SpO2: 96% (04-27-22 @ 04:30) (96% - 96%)      General Exam:    General: Pt Alert and oriented, NAD, controlled pain.    Dressings: ACE wrapping clean, dry, intact with no bleeding noted. ACE removed. Incision site clean, dry, prineo intact. No active bleeding noted. New dressings placed.     Skin: No erythema noted to visible skin. Compartment soft, compressible NT.     Pulses: 2+ dorsalis pedis pulse. Cap refill < 2 sec.    Sensation: Grossly intact to light touch without deficit.    Motor: +EHL/FHL/AT/GC      A/P: 56y Female  POD# 2 s/p right tibial plateau ORID     - Pain Control  - DVT ppx: Lovenox 40 daily  - PT   - Weight bearing status: NWB RLE  - Continue Ice/ Elevation  - DC planning

## 2022-04-27 NOTE — PROGRESS NOTE ADULT - SUBJECTIVE AND OBJECTIVE BOX
Patient is a 56y old  Female who presents with a chief complaint of right prox tib fx (27 Apr 2022 08:40)    Patient seen and examined at bedside.     ALLERGIES:  No Known Allergies    MEDICATIONS  (STANDING):  acetaminophen     Tablet .. 975 milliGRAM(s) Oral every 8 hours  atorvastatin 20 milliGRAM(s) Oral at bedtime  enoxaparin Injectable 40 milliGRAM(s) SubCutaneous every 24 hours  fluticasone propionate 50 MICROgram(s)/spray Nasal Spray 1 Spray(s) Both Nostrils two times a day    MEDICATIONS  (PRN):  HYDROmorphone   Tablet 4 milliGRAM(s) Oral every 4 hours PRN Severe Pain (7 - 10)  HYDROmorphone  Injectable 0.5 milliGRAM(s) IV Push every 4 hours PRN breakthrough pain  oxyCODONE    IR 10 milliGRAM(s) Oral every 4 hours PRN Moderate Pain (4 - 6)  oxyCODONE    IR 5 milliGRAM(s) Oral every 4 hours PRN Mild Pain (1 - 3)    Vital Signs Last 24 Hrs  T(F): 98.4 (27 Apr 2022 04:30), Max: 98.6 (26 Apr 2022 16:42)  HR: 67 (27 Apr 2022 04:30) (67 - 73)  BP: 125/73 (27 Apr 2022 04:30) (125/73 - 143/80)  RR: 18 (27 Apr 2022 04:30) (18 - 18)  SpO2: 96% (27 Apr 2022 04:30) (94% - 97%)  I&O's Summary    26 Apr 2022 07:01  -  27 Apr 2022 07:00  --------------------------------------------------------  IN: 360 mL / OUT: 2500 mL / NET: -2140 mL      PHYSICAL EXAM:  General: NAD, A/O x 3  ENT: MMM, no thrush  Neck: Supple, No JVD  Lungs: Clear to auscultation bilaterally, good air entry, non-labored breathing  Cardio: +s1/s2  Abdomen: Soft, Nontender, Nondistended; Bowel sounds present  Extremities: No calf tenderness, No pitting edema      LABS:                        10.3   13.19 )-----------( 343      ( 27 Apr 2022 06:22 )             33.4     04-27    137  |  100  |  14.4  ----------------------------<  121  4.7   |  27.0  |  0.77    Ca    9.1      27 Apr 2022 06:22    RADIOLOGY & ADDITIONAL TESTS:  - no new tests    Care Discussed with Consultants/Other Providers:   Orthopedic Surgery

## 2022-04-28 ENCOUNTER — TRANSCRIPTION ENCOUNTER (OUTPATIENT)
Age: 57
End: 2022-04-28

## 2022-04-28 VITALS
TEMPERATURE: 99 F | RESPIRATION RATE: 18 BRPM | HEART RATE: 78 BPM | SYSTOLIC BLOOD PRESSURE: 109 MMHG | DIASTOLIC BLOOD PRESSURE: 63 MMHG | OXYGEN SATURATION: 97 %

## 2022-04-28 PROCEDURE — C1713: CPT

## 2022-04-28 PROCEDURE — 94640 AIRWAY INHALATION TREATMENT: CPT

## 2022-04-28 PROCEDURE — 73700 CT LOWER EXTREMITY W/O DYE: CPT | Mod: MA

## 2022-04-28 PROCEDURE — 80048 BASIC METABOLIC PNL TOTAL CA: CPT

## 2022-04-28 PROCEDURE — 96375 TX/PRO/DX INJ NEW DRUG ADDON: CPT

## 2022-04-28 PROCEDURE — 86850 RBC ANTIBODY SCREEN: CPT

## 2022-04-28 PROCEDURE — 99232 SBSQ HOSP IP/OBS MODERATE 35: CPT

## 2022-04-28 PROCEDURE — 93005 ELECTROCARDIOGRAM TRACING: CPT

## 2022-04-28 PROCEDURE — 73590 X-RAY EXAM OF LOWER LEG: CPT

## 2022-04-28 PROCEDURE — 86803 HEPATITIS C AB TEST: CPT

## 2022-04-28 PROCEDURE — 85730 THROMBOPLASTIN TIME PARTIAL: CPT

## 2022-04-28 PROCEDURE — 96376 TX/PRO/DX INJ SAME DRUG ADON: CPT

## 2022-04-28 PROCEDURE — 0225U NFCT DS DNA&RNA 21 SARSCOV2: CPT

## 2022-04-28 PROCEDURE — 76000 FLUOROSCOPY <1 HR PHYS/QHP: CPT

## 2022-04-28 PROCEDURE — 80053 COMPREHEN METABOLIC PANEL: CPT

## 2022-04-28 PROCEDURE — 71045 X-RAY EXAM CHEST 1 VIEW: CPT

## 2022-04-28 PROCEDURE — C1889: CPT

## 2022-04-28 PROCEDURE — 86901 BLOOD TYPING SEROLOGIC RH(D): CPT

## 2022-04-28 PROCEDURE — 85025 COMPLETE CBC W/AUTO DIFF WBC: CPT

## 2022-04-28 PROCEDURE — 86900 BLOOD TYPING SEROLOGIC ABO: CPT

## 2022-04-28 PROCEDURE — 85610 PROTHROMBIN TIME: CPT

## 2022-04-28 PROCEDURE — 73552 X-RAY EXAM OF FEMUR 2/>: CPT

## 2022-04-28 PROCEDURE — 99285 EMERGENCY DEPT VISIT HI MDM: CPT

## 2022-04-28 PROCEDURE — 96365 THER/PROPH/DIAG IV INF INIT: CPT

## 2022-04-28 PROCEDURE — 36415 COLL VENOUS BLD VENIPUNCTURE: CPT

## 2022-04-28 PROCEDURE — 96366 THER/PROPH/DIAG IV INF ADDON: CPT

## 2022-04-28 RX ADMIN — Medication 975 MILLIGRAM(S): at 06:40

## 2022-04-28 RX ADMIN — HYDROMORPHONE HYDROCHLORIDE 0.5 MILLIGRAM(S): 2 INJECTION INTRAMUSCULAR; INTRAVENOUS; SUBCUTANEOUS at 01:00

## 2022-04-28 RX ADMIN — Medication 1 SPRAY(S): at 06:06

## 2022-04-28 RX ADMIN — OXYCODONE HYDROCHLORIDE 10 MILLIGRAM(S): 5 TABLET ORAL at 06:39

## 2022-04-28 RX ADMIN — HYDROMORPHONE HYDROCHLORIDE 0.5 MILLIGRAM(S): 2 INJECTION INTRAMUSCULAR; INTRAVENOUS; SUBCUTANEOUS at 10:00

## 2022-04-28 RX ADMIN — OXYCODONE HYDROCHLORIDE 10 MILLIGRAM(S): 5 TABLET ORAL at 06:03

## 2022-04-28 RX ADMIN — OXYCODONE HYDROCHLORIDE 10 MILLIGRAM(S): 5 TABLET ORAL at 02:15

## 2022-04-28 RX ADMIN — OXYCODONE HYDROCHLORIDE 10 MILLIGRAM(S): 5 TABLET ORAL at 11:20

## 2022-04-28 RX ADMIN — OXYCODONE HYDROCHLORIDE 10 MILLIGRAM(S): 5 TABLET ORAL at 01:41

## 2022-04-28 RX ADMIN — ENOXAPARIN SODIUM 40 MILLIGRAM(S): 100 INJECTION SUBCUTANEOUS at 06:04

## 2022-04-28 RX ADMIN — HYDROMORPHONE HYDROCHLORIDE 0.5 MILLIGRAM(S): 2 INJECTION INTRAMUSCULAR; INTRAVENOUS; SUBCUTANEOUS at 00:17

## 2022-04-28 RX ADMIN — Medication 975 MILLIGRAM(S): at 06:04

## 2022-04-28 RX ADMIN — HYDROMORPHONE HYDROCHLORIDE 0.5 MILLIGRAM(S): 2 INJECTION INTRAMUSCULAR; INTRAVENOUS; SUBCUTANEOUS at 10:52

## 2022-04-28 NOTE — PROGRESS NOTE ADULT - SUBJECTIVE AND OBJECTIVE BOX
Patient is a 56y old  Female who presents with a chief complaint of right prox tib fx (28 Apr 2022 08:57)    Patient seen and examined at bedside.     ALLERGIES:  No Known Allergies    MEDICATIONS  (STANDING):  acetaminophen     Tablet .. 975 milliGRAM(s) Oral every 8 hours  atorvastatin 20 milliGRAM(s) Oral at bedtime  enoxaparin Injectable 40 milliGRAM(s) SubCutaneous every 24 hours  fluticasone propionate 50 MICROgram(s)/spray Nasal Spray 1 Spray(s) Both Nostrils two times a day    MEDICATIONS  (PRN):  HYDROmorphone   Tablet 4 milliGRAM(s) Oral every 4 hours PRN Severe Pain (7 - 10)  HYDROmorphone  Injectable 0.5 milliGRAM(s) IV Push every 4 hours PRN breakthrough pain  oxyCODONE    IR 10 milliGRAM(s) Oral every 4 hours PRN Moderate Pain (4 - 6)  oxyCODONE    IR 5 milliGRAM(s) Oral every 4 hours PRN Mild Pain (1 - 3)    Vital Signs Last 24 Hrs  T(F): 98.3 (28 Apr 2022 05:00), Max: 98.6 (27 Apr 2022 11:20)  HR: 71 (28 Apr 2022 05:00) (71 - 78)  BP: 113/65 (28 Apr 2022 05:00) (113/65 - 160/70)  RR: 18 (28 Apr 2022 05:00) (17 - 18)  SpO2: 93% (28 Apr 2022 05:00) (93% - 98%)  I&O's Summary    27 Apr 2022 07:01  -  28 Apr 2022 07:00  --------------------------------------------------------  IN: 0 mL / OUT: 400 mL / NET: -400 mL      PHYSICAL EXAM:  General: NAD, A/O x 3  ENT: MMM, no thrush  Neck: Supple, No JVD  Lungs: Clear to auscultation bilaterally, good air entry, non-labored breathing  Cardio: +s1/s2  Abdomen: Soft, Nontender, Nondistended; Bowel sounds present  Extremities: No calf tenderness, +right leg in immobilizer      LABS:                        10.3   13.19 )-----------( 343      ( 27 Apr 2022 06:22 )             33.4     04-27    137  |  100  |  14.4  ----------------------------<  121  4.7   |  27.0  |  0.77    Ca    9.1      27 Apr 2022 06:22    RADIOLOGY & ADDITIONAL TESTS:  - no new tests    Care Discussed with Consultants/Other Providers:   Orthopedic Surgery

## 2022-04-28 NOTE — PROGRESS NOTE ADULT - PROVIDER SPECIALTY LIST ADULT
Hospitalist
Hospitalist
Orthopedics
Hospitalist
Orthopedics
Hospitalist
Orthopedics
Hospitalist
Hospitalist

## 2022-04-28 NOTE — DISCHARGE NOTE NURSING/CASE MANAGEMENT/SOCIAL WORK - PATIENT PORTAL LINK FT
You can access the FollowMyHealth Patient Portal offered by [facility] by registering at the following website: [email or web address]. By joining [employer]’s FollowMyHealth portal, you will also be able to view your health information using other applications (apps) compatible with our system.

## 2022-04-28 NOTE — PROGRESS NOTE ADULT - SUBJECTIVE AND OBJECTIVE BOX
ORTHO-TRAUMA SERVICE      Pt Name: EARNESTINE BERNSTEIN    MRN: [id number]    Procedure: Right tibial plateau ORIF   Surgeon: Dr. Gamez  DOS: 4/25      Pt seen and examined. Pt comfortable without complaints, complaining of pain that waxes and wanes. Participating in PT. Denies CP, SOB, N/V, numbness/tingling. Denies acute motor or sensory changes.       PAST MEDICAL & SURGICAL HISTORY:  PAST MEDICAL & SURGICAL HISTORY:      Allergies: No Known Allergies      Medications: acetaminophen     Tablet .. 975 milliGRAM(s) Oral every 8 hours  atorvastatin 20 milliGRAM(s) Oral at bedtime  enoxaparin Injectable 40 milliGRAM(s) SubCutaneous every 24 hours  fluticasone propionate 50 MICROgram(s)/spray Nasal Spray 1 Spray(s) Both Nostrils two times a day  HYDROmorphone   Tablet 4 milliGRAM(s) Oral every 4 hours PRN  HYDROmorphone  Injectable 0.5 milliGRAM(s) IV Push every 4 hours PRN  oxyCODONE    IR 10 milliGRAM(s) Oral every 4 hours PRN  oxyCODONE    IR 5 milliGRAM(s) Oral every 4 hours PRN        Ambulation: Walking independently With Walker                           10.3   13.19 )-----------( 343      ( 27 Apr 2022 06:22 )             33.4     04-27    137  |  100  |  14.4  ----------------------------<  121<H>  4.7   |  27.0  |  0.77    Ca    9.1      27 Apr 2022 06:22        PHYSICAL EXAM:    Vital Signs Last 24 Hrs  T(C): 36.8 (28 Apr 2022 05:00), Max: 37 (27 Apr 2022 11:20)  T(F): 98.3 (28 Apr 2022 05:00), Max: 98.6 (27 Apr 2022 11:20)  HR: 71 (28 Apr 2022 05:00) (71 - 78)  BP: 113/65 (28 Apr 2022 05:00) (113/65 - 160/70)  BP(mean): --  RR: 18 (28 Apr 2022 05:00) (17 - 18)  SpO2: 93% (28 Apr 2022 05:00) (93% - 98%)  Daily     Daily     Appearance: Alert, responsive, in no acute distress.    Neurological: Sensation is grossly intact to light touch. No focal deficits or weaknesses found.    Skin: no rash on visible skin. Skin is clean, dry and intact. No bleeding. No abrasions. No ulcerations.    Vascular: 2+ distal pulses. Cap refill < 2 sec. No signs of venous insufficiency or stasis. No extremity ulcerations. No cyanosis.    Musculoskeletal:         Left Lower Extremity: + dorsi plantar flexion. EHL FHL intact. Calf supple non-tender.       Right Lower Extremity: Dressing C/D/I, knee immobilizer in place. + dorsi plantar flexion. EHL FHL intact. Calf supple non-tender.      A/P: 56y Female  POD# 3 s/p right tibial plateau ORIF    - Pain Control  - DVT ppx: Lovenox 40 daily  - PT   - Weight bearing status: NWB RLE  - Continue Ice/ Elevation  - DC planning today CHASIDY

## 2022-04-28 NOTE — PROGRESS NOTE ADULT - REASON FOR ADMISSION
right prox tib fx

## 2022-04-28 NOTE — DISCHARGE NOTE NURSING/CASE MANAGEMENT/SOCIAL WORK - NSDCPEFALRISK_GEN_ALL_CORE
For information on Fall & Injury Prevention, visit: [email or web address] OR  [email or web address] OR  https://www.cdc.gov/steadi/patient.html

## 2022-04-29 NOTE — CDI QUERY NOTE - NSCDIOTHERTXTBX_GEN_ALL_CORE_HH
Can you please clarify if decreasing hemoglobin level supports a clinically significant diagnosis?  A.	Acute blood loss anemia  B.	Postoperative blood loss anemia  C.	Other, please specify  D.	Not clinically significant diagnosis    Supporting documentation:      Hemoglobin:  Hemoglobin: 10.3 g/dL (04.27.22 @ 06:22)   Hemoglobin: 10.4 g/dL (04.26.22 @ 07:20)   Hemoglobin: 10.9 g/dL (04.25.22 @ 06:28)   Hemoglobin: 11.0: Test Repeated Specimen integrity verified. g/dL (04.24.22 @ 06:23)   Hemoglobin: 14.4 g/dL (04.22.22 @ 10:22)       Brief Operative Note [Charted Location: [address] [address]] [Authored: 25-Apr-2022 14:07]  •? PROCEDURES:  Open reduction and internal fixation (ORIF) of fracture of tibial plateau with C-arm fluoroscopic guidance 25-Apr-2022 14:09:17  Silas Hendricks.       Operative Findings:  • Operative Findings	Right tibial plateau fracture     Evidence of Infection or Abscess:  • Evidence of infection or abscess identified at the start or during the surgical procedure:	No    Specimens/Blood Loss/IV/Output/Protocol/VTE:    Specimens/Blood Loss/IV/Output/Protocol/VTE:  • Specimens	none  • Estimated Blood Loss	200 milliLiter(s)

## 2022-05-11 ENCOUNTER — APPOINTMENT (OUTPATIENT)
Dept: ORTHOPEDIC SURGERY | Facility: CLINIC | Age: 57
End: 2022-05-11
Payer: COMMERCIAL

## 2022-05-11 DIAGNOSIS — Z82.49 FAMILY HISTORY OF ISCHEMIC HEART DISEASE AND OTHER DISEASES OF THE CIRCULATORY SYSTEM: ICD-10-CM

## 2022-05-11 DIAGNOSIS — Z82.5 FAMILY HISTORY OF ASTHMA AND OTHER CHRONIC LOWER RESPIRATORY DISEASES: ICD-10-CM

## 2022-05-11 DIAGNOSIS — Z87.891 PERSONAL HISTORY OF NICOTINE DEPENDENCE: ICD-10-CM

## 2022-05-11 DIAGNOSIS — Z87.19 PERSONAL HISTORY OF OTHER DISEASES OF THE DIGESTIVE SYSTEM: ICD-10-CM

## 2022-05-11 DIAGNOSIS — Z86.59 PERSONAL HISTORY OF OTHER MENTAL AND BEHAVIORAL DISORDERS: ICD-10-CM

## 2022-05-11 DIAGNOSIS — Z83.3 FAMILY HISTORY OF DIABETES MELLITUS: ICD-10-CM

## 2022-05-11 DIAGNOSIS — Z78.9 OTHER SPECIFIED HEALTH STATUS: ICD-10-CM

## 2022-05-11 DIAGNOSIS — Z86.39 PERSONAL HISTORY OF OTHER ENDOCRINE, NUTRITIONAL AND METABOLIC DISEASE: ICD-10-CM

## 2022-05-11 PROCEDURE — 73560 X-RAY EXAM OF KNEE 1 OR 2: CPT | Mod: RT

## 2022-05-11 PROCEDURE — 99024 POSTOP FOLLOW-UP VISIT: CPT

## 2022-05-11 RX ORDER — ATORVASTATIN CALCIUM 20 MG/1
20 TABLET, FILM COATED ORAL
Refills: 0 | Status: ACTIVE | COMMUNITY

## 2022-05-11 RX ORDER — ALPRAZOLAM 2 MG/1
TABLET ORAL
Refills: 0 | Status: ACTIVE | COMMUNITY

## 2022-05-11 RX ORDER — ACETAMINOPHEN 325 MG/1
TABLET, FILM COATED ORAL
Refills: 0 | Status: ACTIVE | COMMUNITY

## 2022-05-11 RX ORDER — FLUTICASONE PROPIONATE 50 MCG
50 SPRAY, SUSPENSION NASAL
Refills: 0 | Status: ACTIVE | COMMUNITY

## 2022-05-11 NOTE — HISTORY OF PRESENT ILLNESS
[Clean/Dry/Intact] : clean, dry and intact [Erythema] : not erythematous [Discharge] : absent of discharge [Swelling] : swollen [Dehiscence] : not dehisced [Neuro Intact] : an unremarkable neurological exam [Vascular Intact] : ~T peripheral vascular exam normal [Doing Well] : is doing well [Excellent Pain Control] : has excellent pain control [No Sign of Infection] : is showing no signs of infection [de-identified] : s/p open reduction with internal fixation of right bicondylar tibial plateau fracture. 4/25/22 [de-identified] : 55 y/o F s/p open reduction with internal fixation of right bicondylar tibial plateau fracture. 4/25/22 [de-identified] : Physical Exam:\par General: Well appearing, no acute distress, A&O\par Neurologic: A&Ox3, No focal deficits\par Head: NCAT without abrasions, lacerations, or ecchymosis to head, face, or scalp\par Respiratory: Equal chest wall expansion bilaterally, no accessory muscle use\par Lymphatic: No lymphadenopathy palpated\par Skin: Warm and dry\par Psychiatric: Normal mood and affect\par \par SILT s/s/sp/dp/t\par Fires EHL/FHL/GS/TA\par 2+ DP/PT pulse\par brisk capillary refill [de-identified] : Plain films of the right knee obtained today show stable fixation of bicondylar tibial plateau fracture [de-identified] : We will allow the patient for full range of motion but nonweightbearing.  Recommend physical therapy to work on range of motion.  She has been wearing her knee immobilizer the majority of the time since surgery and while she has great extension, she is stiff in flexion.  Would recommend coming back in a month for repeat x-rays and likely advance to weightbearing as tolerated.\par \par The patient was given the opportunity to ask questions and all questions were answered to their satisfaction.\par \par Harpreet Gamez MD\par Orthopaedic Trauma Surgeon\par [facility]\par [facility] [place]\par Director Orthopaedic Trauma, [facility]\par \par \par \par

## 2022-06-13 ENCOUNTER — APPOINTMENT (OUTPATIENT)
Dept: ORTHOPEDIC SURGERY | Facility: CLINIC | Age: 57
End: 2022-06-13
Payer: COMMERCIAL

## 2022-06-13 PROCEDURE — 73560 X-RAY EXAM OF KNEE 1 OR 2: CPT | Mod: RT

## 2022-06-13 PROCEDURE — 99024 POSTOP FOLLOW-UP VISIT: CPT

## 2022-06-13 NOTE — HISTORY OF PRESENT ILLNESS
[Clean/Dry/Intact] : clean, dry and intact [Erythema] : not erythematous [Discharge] : absent of discharge [Swelling] : swollen [Dehiscence] : not dehisced [Neuro Intact] : an unremarkable neurological exam [Vascular Intact] : ~T peripheral vascular exam normal [Hardware in Good Position] : hardware in good position [Good Overall Alignment] : good overall alignment [Doing Well] : is doing well [Excellent Pain Control] : has excellent pain control [No Sign of Infection] : is showing no signs of infection [None] : None [de-identified] : s/p open reduction with internal fixation of right bicondylar tibial plateau fracture. 4/25/22 [de-identified] : 55 y/o F s/p open reduction with internal fixation of right bicondylar tibial plateau fracture. 4/25/22 [de-identified] : Physical Exam:\par General: Well appearing, no acute distress, A&O\par Neurologic: A&Ox3, No focal deficits\par Head: NCAT without abrasions, lacerations, or ecchymosis to head, face, or scalp\par Respiratory: Equal chest wall expansion bilaterally, no accessory muscle use\par Lymphatic: No lymphadenopathy palpated\par Skin: Warm and dry\par Psychiatric: Normal mood and affect\par \par SILT s/s/sp/dp/t\par Fires EHL/FHL/GS/TA\par 2+ DP/PT pulse\par brisk capillary refill [de-identified] : 2 view Plain films of the right knee obtained today show stable fixation of bicondylar tibial plateau fracture [de-identified] : We will allow the patient for full range of motion & weightbearing.  Recommend physical therapy to work on range of motion.  she is stiff in flexion.  Would recommend coming back in a month for repeat x-rays.\par \par The patient was given the opportunity to ask questions and all questions were answered to their satisfaction.\par \par Harpreet Gamez MD\par Orthopaedic Trauma Surgeon\par [facility]\par [facility] [place]\par Director Orthopaedic Trauma, [facility]\par \par \par \par

## 2022-06-28 ENCOUNTER — EMERGENCY (EMERGENCY)
Facility: HOSPITAL | Age: 57
LOS: 1 days | Discharge: HOME | End: 2022-06-28
Attending: EMERGENCY MEDICINE
Payer: COMMERCIAL

## 2022-06-28 ENCOUNTER — NON-APPOINTMENT (OUTPATIENT)
Age: 57
End: 2022-06-28

## 2022-06-28 VITALS
HEIGHT: 66 IN | DIASTOLIC BLOOD PRESSURE: 74 MMHG | HEART RATE: 64 BPM | OXYGEN SATURATION: 99 % | SYSTOLIC BLOOD PRESSURE: 158 MMHG | TEMPERATURE: 98 F | RESPIRATION RATE: 16 BRPM

## 2022-06-28 LAB
APTT BLD: 29.4 SEC — SIGNIFICANT CHANGE UP (ref 27.5–35.5)
INR BLD: 1.06 RATIO — SIGNIFICANT CHANGE UP (ref 0.88–1.16)
PROTHROM AB SERPL-ACNC: 12.3 SEC — SIGNIFICANT CHANGE UP (ref 10.5–13.4)

## 2022-06-28 PROCEDURE — 99284 EMERGENCY DEPT VISIT MOD MDM: CPT

## 2022-06-28 NOTE — ED PROVIDER NOTE - NSFOLLOWUPCLINICS_GEN_ALL_ED_FT
[facility]  Hematology/Oncology  [address] [place] [address]  Phone: [phone]  Fax:

## 2022-06-28 NOTE — ED PROVIDER NOTE - ATTENDING APP SHARED VISIT CONTRIBUTION OF CARE
I, Grace Padilla, performed the initial face to face bedside interview with this patient regarding history of present illness, review of symptoms and relevant past medical, social and family history.  I completed an independent physical examination.  I was the initial provider who evaluated this patient. I have signed out the follow up of any pending tests (i.e. labs, radiological studies) to the ACP.  I have communicated the patient’s plan of care and disposition with the ACP.  The history, relevant review of systems, past medical and surgical history, medical decision making, and physical examination was documented by the scribe in my presence and I attest to the accuracy of the documentation.

## 2022-06-28 NOTE — ED PROVIDER NOTE - OBJECTIVE STATEMENT
55 y/o female with no PMHx presents to ED s/p dx of (+)DVT. Patient reports swelling and pain to RLE x1 week. Patient had an US at [facility] today, that showed a RLE DVT.

## 2022-06-28 NOTE — ED ADULT TRIAGE NOTE - CHIEF COMPLAINT QUOTE
Ambulatory via wheelchair reporting RLE pain that started Saturday. PMH of tibial plateau fracture 4/22, went for doppler and was found to have + DVT in RLE. Pain to back of RLE, denies SOB or difficulty breathing

## 2022-06-28 NOTE — ED PROVIDER NOTE - NSFOLLOWUPINSTRUCTIONS_ED_ALL_ED_FT
[facility]                                                                                                                                                                                      Deep Vein Thrombosis       Deep vein thrombosis (DVT) is a condition in which a blood clot forms in a deep vein, such as a vein in the lower leg, thigh, pelvis, or arm. Deep veins are veins in the deep venous system. A clot is blood that has thickened into a gel or solid. This condition is serious and can be life-threatening if the clot travels to the lungs and causes a blockage (pulmonary embolism) in the arteries of the lung. A DVT can also damage veins in the leg. This can lead to long-term, or chronic, venous disease, leg pain, swelling, discoloration, and ulcers or sores (post-thrombotic syndrome).      What are the causes?    This condition may be caused by:  •A slowdown of blood flow.      •Damage to a vein.      •A condition that causes blood to clot more easily, such as certain blood-clotting disorders.        What increases the risk?    The following factors may make you more likely to develop this condition:  •Having obesity.      •Being older, especially older than age 60.    •Being inactive (sedentary lifestyle) or not moving around. This may include:  •Sitting or lying down for longer than 4–6 hours other than to sleep at night.      •Being in the hospital, having major or lengthy surgery, or having a thin, flexible tube (central line catheter) placed in a large vein.        •Being pregnant, giving birth, or having recently given birth.      •Taking medicines that contain estrogen, such as birth control or hormone replacement therapy.      •Using products that contain nicotine or tobacco, especially if you use hormonal birth control.      •Having a history of blood clots or a blood-clotting disease, a blood vessel disease (peripheral vascular disease), or congestive heart disease.      •Having a history of cancer, especially if being treated with chemotherapy.        What are the signs or symptoms?    Symptoms of this condition include:  •Swelling, pain, pressure, or tenderness in an arm or a leg.      •An arm or a leg becoming warm, red, or discolored.      •A leg turning very pale. You may have a large DVT. This is rare.      If the clot is in your leg, you may notice symptoms more or have worse symptoms when you stand or walk.    In some cases, there are no symptoms.      How is this diagnosed?    This condition is diagnosed with:  •Your medical history and a physical exam.    •Tests, such as:  •Blood tests to check how well your blood clots.      •Doppler ultrasound. This is the best way to find a DVT.      •Venogram. Contrast dye is injected into a vein, and X-rays are taken to check for clots.          How is this treated?    Treatment for this condition depends on:  •The cause of your DVT.      •The size and location of your DVT, or having more than one DVT.      •Your risk for bleeding or developing more clots.      •Other medical conditions you may have.      Treatment may include:  •Taking a blood thinner, also called an anticoagulant, to prevent clots from forming and growing.      •Wearing compression stockings, if directed.      •Injecting medicines into the affected vein to break up the clot (catheter-directed thrombolysis). This is used only for severe DVT and only if a specialist recommends it.    •Specific surgical procedures, when DVT is severe or hard to treat. These may be done to:  •Isolate and remove your clot.      •Place an inferior vena cava (IVC) filter in a large vein to catch blood clots before they reach your lungs.        You may get some medical treatments for 6 months or longer.      Follow these instructions at home:    If you are taking blood thinners:     •Talk with your health care provider before you take any medicines that contain aspirin or NSAIDs, such as ibuprofen. These medicines increase your risk for dangerous bleeding.      •Take your medicine exactly as told, at the same time every day. Do not skip a dose. Do not take more than the prescribed dose. This is important.      •Ask your health care provider about foods and medicines that could change the way your blood thinner works (may interact). Avoid these foods and medicines if you are told to do so.    •Avoid anything that may cause bleeding or bruising. You may bleed more easily while taking blood thinners.  •Be very careful when using knives, scissors, or other sharp objects.      •Use an electric razor instead of a blade.      •Avoid activities that could cause injury or bruising, and follow instructions for preventing falls.      •Tell your health care provider if you have had any internal bleeding, bleeding ulcers, or neurologic diseases, such as strokes or cerebral aneurysms.        •Wear a medical alert bracelet or carry a card that lists what medicines you take.      General instructions     •Take over-the-counter and prescription medicines only as told by your health care provider.      •Return to your normal activities as told by your health care provider. Ask your health care provider what activities are safe for you.      •If recommended, wear compression stockings as told by your health care provider. These stockings help to prevent blood clots and reduce swelling in your legs.      •Keep all follow-up visits as told by your health care provider. This is important.        Contact a health care provider if:    •You miss a dose of your blood thinner.      •You have new or worse pain, swelling, or redness in an arm or a leg.      •You have worsening numbness or tingling in an arm or a leg.      •You have unusual bruising.        Get help right away if:  •You have signs or symptoms that a blood clot has moved to the lungs. These may include:  •Shortness of breath.      •Chest pain.      •Fast or irregular heartbeats (palpitations).      •Light-headedness or dizziness.      •Coughing up blood.      •You have signs or symptoms that your blood is too thin. These may include:  •Blood in your vomit, stool, or urine.      •A cut that will not stop bleeding.      •A menstrual period that is heavier than usual.      •A severe headache or confusion.        These symptoms may represent a serious problem that is an emergency. Do not wait to see if the symptoms will go away. Get medical help right away. Call your local emergency services (911 in the [place] Do not drive yourself to the hospital.       Summary    •Deep vein thrombosis (DVT) happens when a blood clot forms in a deep vein. This may occur in the lower leg, thigh, pelvis, or arm.      •Symptoms affect the arm or leg and can include swelling, pain, tenderness, warmth, redness, or discoloration.      •This condition may be treated with medicines or compression stockings. In severe cases, surgery may be done.      •If you are taking blood thinners, take them exactly as told. Do not skip a dose. Do not take more than is prescribed.      •Get help right away if you have shortness of breath, chest pain, fast or irregular heartbeats, or blood in your vomit, urine, or stool.      This information is not intended to replace advice given to you by your health care provider. Make sure you discuss any questions you have with your health care provider.      Document Revised: 12/11/2020 Document Reviewed: 12/12/2020    Elsevier Patient Education © 2022 [employer] Inc[employer]

## 2022-06-28 NOTE — ED PROVIDER NOTE - PATIENT PORTAL LINK FT
You can access the FollowMyHealth Patient Portal offered by [facility] by registering at the following website: [email or web address]. By joining [facility]’s FollowMyHealth portal, you will also be able to view your health information using other applications (apps) compatible with our system.

## 2022-06-28 NOTE — ED PROVIDER NOTE - CARE PROVIDER_API CALL
Dimitrios Morrow)  Surgery; Vascular Surgery  [address], [address]st [place] [address]  Phone: [phone]  Fax: [phone]  Follow Up Time:

## 2022-06-29 ENCOUNTER — OUTPATIENT (OUTPATIENT)
Dept: OUTPATIENT SERVICES | Facility: HOSPITAL | Age: 57
LOS: 1 days | End: 2022-06-29

## 2022-06-29 ENCOUNTER — APPOINTMENT (OUTPATIENT)
Dept: ULTRASOUND IMAGING | Facility: CLINIC | Age: 57
End: 2022-06-29

## 2022-06-29 ENCOUNTER — NON-APPOINTMENT (OUTPATIENT)
Age: 57
End: 2022-06-29

## 2022-06-29 VITALS
OXYGEN SATURATION: 98 % | RESPIRATION RATE: 18 BRPM | HEART RATE: 61 BPM | DIASTOLIC BLOOD PRESSURE: 79 MMHG | SYSTOLIC BLOOD PRESSURE: 147 MMHG

## 2022-06-29 DIAGNOSIS — S82.141D DISPLACED BICONDYLAR FRACTURE OF RIGHT TIBIA, SUBSEQUENT ENCOUNTER FOR CLOSED FRACTURE WITH ROUTINE HEALING: ICD-10-CM

## 2022-06-29 LAB
ANION GAP SERPL CALC-SCNC: 13 MMOL/L — SIGNIFICANT CHANGE UP (ref 5–17)
BASOPHILS # BLD AUTO: 0.05 K/UL — SIGNIFICANT CHANGE UP (ref 0–0.2)
BASOPHILS NFR BLD AUTO: 0.5 % — SIGNIFICANT CHANGE UP (ref 0–2)
BUN SERPL-MCNC: 17.4 MG/DL — SIGNIFICANT CHANGE UP (ref 8–20)
CALCIUM SERPL-MCNC: 9.8 MG/DL — SIGNIFICANT CHANGE UP (ref 8.6–10.2)
CHLORIDE SERPL-SCNC: 102 MMOL/L — SIGNIFICANT CHANGE UP (ref 98–107)
CO2 SERPL-SCNC: 24 MMOL/L — SIGNIFICANT CHANGE UP (ref 22–29)
CREAT SERPL-MCNC: 0.81 MG/DL — SIGNIFICANT CHANGE UP (ref 0.5–1.3)
EGFR: 85 ML/MIN/1.73M2 — SIGNIFICANT CHANGE UP
EOSINOPHIL # BLD AUTO: 0.21 K/UL — SIGNIFICANT CHANGE UP (ref 0–0.5)
EOSINOPHIL NFR BLD AUTO: 2.3 % — SIGNIFICANT CHANGE UP (ref 0–6)
GLUCOSE SERPL-MCNC: 91 MG/DL — SIGNIFICANT CHANGE UP (ref 70–99)
HCT VFR BLD CALC: 40.2 % — SIGNIFICANT CHANGE UP (ref 34.5–45)
HGB BLD-MCNC: 12.7 G/DL — SIGNIFICANT CHANGE UP (ref 11.5–15.5)
IMM GRANULOCYTES NFR BLD AUTO: 0.3 % — SIGNIFICANT CHANGE UP (ref 0–1.5)
LYMPHOCYTES # BLD AUTO: 2.67 K/UL — SIGNIFICANT CHANGE UP (ref 1–3.3)
LYMPHOCYTES # BLD AUTO: 29.1 % — SIGNIFICANT CHANGE UP (ref 13–44)
MCHC RBC-ENTMCNC: 27.6 PG — SIGNIFICANT CHANGE UP (ref 27–34)
MCHC RBC-ENTMCNC: 31.6 GM/DL — LOW (ref 32–36)
MCV RBC AUTO: 87.4 FL — SIGNIFICANT CHANGE UP (ref 80–100)
MONOCYTES # BLD AUTO: 0.58 K/UL — SIGNIFICANT CHANGE UP (ref 0–0.9)
MONOCYTES NFR BLD AUTO: 6.3 % — SIGNIFICANT CHANGE UP (ref 2–14)
NEUTROPHILS # BLD AUTO: 5.63 K/UL — SIGNIFICANT CHANGE UP (ref 1.8–7.4)
NEUTROPHILS NFR BLD AUTO: 61.5 % — SIGNIFICANT CHANGE UP (ref 43–77)
PLATELET # BLD AUTO: 338 K/UL — SIGNIFICANT CHANGE UP (ref 150–400)
POTASSIUM SERPL-MCNC: 4 MMOL/L — SIGNIFICANT CHANGE UP (ref 3.5–5.3)
POTASSIUM SERPL-SCNC: 4 MMOL/L — SIGNIFICANT CHANGE UP (ref 3.5–5.3)
RBC # BLD: 4.6 M/UL — SIGNIFICANT CHANGE UP (ref 3.8–5.2)
RBC # FLD: 13.2 % — SIGNIFICANT CHANGE UP (ref 10.3–14.5)
SODIUM SERPL-SCNC: 139 MMOL/L — SIGNIFICANT CHANGE UP (ref 135–145)
WBC # BLD: 9.17 K/UL — SIGNIFICANT CHANGE UP (ref 3.8–10.5)
WBC # FLD AUTO: 9.17 K/UL — SIGNIFICANT CHANGE UP (ref 3.8–10.5)

## 2022-06-29 PROCEDURE — 80048 BASIC METABOLIC PNL TOTAL CA: CPT

## 2022-06-29 PROCEDURE — 36415 COLL VENOUS BLD VENIPUNCTURE: CPT

## 2022-06-29 PROCEDURE — 85610 PROTHROMBIN TIME: CPT

## 2022-06-29 PROCEDURE — 99283 EMERGENCY DEPT VISIT LOW MDM: CPT

## 2022-06-29 PROCEDURE — 85730 THROMBOPLASTIN TIME PARTIAL: CPT

## 2022-06-29 PROCEDURE — 85025 COMPLETE CBC W/AUTO DIFF WBC: CPT

## 2022-06-29 RX ORDER — FONDAPARINUX SODIUM 2.5 MG/.5ML
1 INJECTION, SOLUTION SUBCUTANEOUS
Qty: 42 | Refills: 0
Start: 2022-06-29 | End: 2022-07-19

## 2022-06-29 RX ORDER — RIVAROXABAN 15 MG-20MG
15 KIT ORAL ONCE
Refills: 0 | Status: COMPLETED | OUTPATIENT
Start: 2022-06-29 | End: 2022-06-29

## 2022-06-29 RX ORDER — OXYCODONE AND ACETAMINOPHEN 5; 325 MG/1; MG/1
1 TABLET ORAL ONCE
Refills: 0 | Status: DISCONTINUED | OUTPATIENT
Start: 2022-06-29 | End: 2022-06-29

## 2022-06-29 RX ADMIN — RIVAROXABAN 15 MILLIGRAM(S): KIT at 02:07

## 2022-06-29 RX ADMIN — OXYCODONE AND ACETAMINOPHEN 1 TABLET(S): 5; 325 TABLET ORAL at 02:06

## 2022-07-05 ENCOUNTER — OUTPATIENT (OUTPATIENT)
Dept: OUTPATIENT SERVICES | Facility: HOSPITAL | Age: 57
LOS: 1 days | Discharge: HOME | End: 2022-07-05

## 2022-07-05 DIAGNOSIS — I82.90 ACUTE EMBOLISM AND THROMBOSIS OF UNSPECIFIED VEIN: ICD-10-CM

## 2022-07-08 ENCOUNTER — APPOINTMENT (OUTPATIENT)
Dept: HEMATOLOGY ONCOLOGY | Facility: CLINIC | Age: 57
End: 2022-07-08

## 2022-07-08 VITALS
SYSTOLIC BLOOD PRESSURE: 133 MMHG | OXYGEN SATURATION: 5 % | DIASTOLIC BLOOD PRESSURE: 82 MMHG | BODY MASS INDEX: 40.18 KG/M2 | HEART RATE: 73 BPM | WEIGHT: 250 LBS | HEIGHT: 66 IN

## 2022-07-08 PROCEDURE — 99205 OFFICE O/P NEW HI 60 MIN: CPT

## 2022-07-08 NOTE — ADDENDUM
[FreeTextEntry1] : Documented by Dennise Carranza acting as scribe for Dr. Padilla on 07/08/2022 \par \par All Medical record entries made by the Scribe were at my, Dr. Padilla's, direction and personally dictated by me on 07/08/2022 . I have reviewed the chart and agree that the record accurately reflects my personal performance of the history, physical exam, assessment and plan. I have also personally directed, reviewed, and agreed with the discharge instructions.\par

## 2022-07-08 NOTE — ASSESSMENT
[FreeTextEntry1] : 56 year old F w/ no PMHx  S/p open reduction with internal fixation of right bicondylar tibial plateau fracture on 4/25/22 at [facility]. She was in Rehab post surgery Was discharged on 5/24/22. She was on Lovenox 40 mg in Rehab \par She was admitted to E/D on 6/28/22 c/o RLE swelling and pain for 1 week. US Showed  RLE DVT on 6/28/22. \par \par -Patient with provoked Rt LE DVT DVT likely due to immobilization. post surgery \par -Discussed provoked vs. unprovoked PE/DVT\par - Brother with FVL Mutation  ( unclear if homozygoua or heterozygous) and is on Life long AC. Patient was tested for FVL 30 + y ago and was noted to be negative per patient \par -Currently on Xarleto 15 bid,, rx sent for Xarleto 20 mg daily \par -Discussed warning signs n symptoms of worsening VTE/ s/s of PE and bleeding Advised to call office\par -F/u in 3-4 months\par \par

## 2022-07-08 NOTE — HISTORY OF PRESENT ILLNESS
[de-identified] : 56 year old F w/ no PMHx  S/p open reduction with internal fixation of right bicondylar tibial plateau fracture on 4/25/22 at [facility]. She was admitted to E/D on 6/28/22 c/o RLE swelling and pain for 1 week. US Showed  RLE DVT on 6/28/22. \par \par US venous doppler 6/28/22 There is thrombus noted in the distal femoral vein, popliteal vein proximal posterior tibial vein without flow or compressibility. Thrombus is also noted in the right gastrocnemius vein. There is no Baker's cyst. RLE Postive for DVT. \par \par \par FMHx: \par Brother is positive for factor V Leiden gene mutation. Had a blood clot at 30. On life time anticoagulation\par Brother has blood clot in arm; Negative for factor V Leiden gene mutation.\par \par Orthopaedic Trauma Surgeon; Harpreet Gamez MD\par  [de-identified] : Patient presents for an initial consultation with  [facility]. In a wheel chair.\par \par Patient fell off of porch and had a tibial fracture. s/p open reduction with internal fixation of right bicondylar tibial plateau fracture. 4/25/22. \par Started on Lovenox since 4/25/22 after surgery, stopped on 5/24/22 ( when she was discharged from Rehab) \par S/p 4 weeks of rehab on 5/24/22\par Partial weight bearing since 6/13/22\par Noted to have a Rt LL DVT at  and has been On Xarelto since 6/28/22\par Patient reports pain, numbness, tingling, swelling, tightness of RLE. Admits having feeling lost in the right tibial region 2/2 to surgery. She is slowly regaining feeling. \par

## 2022-07-11 RX ORDER — ATORVASTATIN CALCIUM 80 MG/1
1 TABLET, FILM COATED ORAL
Qty: 0 | Refills: 0 | DISCHARGE

## 2022-07-11 RX ORDER — PANTOPRAZOLE SODIUM 20 MG/1
1 TABLET, DELAYED RELEASE ORAL
Qty: 0 | Refills: 0 | DISCHARGE

## 2022-07-13 ENCOUNTER — APPOINTMENT (OUTPATIENT)
Dept: VASCULAR SURGERY | Facility: CLINIC | Age: 57
End: 2022-07-13

## 2022-07-13 ENCOUNTER — APPOINTMENT (OUTPATIENT)
Dept: ORTHOPEDIC SURGERY | Facility: CLINIC | Age: 57
End: 2022-07-13

## 2022-07-13 VITALS
HEART RATE: 73 BPM | DIASTOLIC BLOOD PRESSURE: 80 MMHG | OXYGEN SATURATION: 94 % | HEIGHT: 66 IN | TEMPERATURE: 98.6 F | SYSTOLIC BLOOD PRESSURE: 152 MMHG

## 2022-07-13 PROCEDURE — 99203 OFFICE O/P NEW LOW 30 MIN: CPT

## 2022-07-13 PROCEDURE — 99214 OFFICE O/P EST MOD 30 MIN: CPT

## 2022-07-13 PROCEDURE — 93970 EXTREMITY STUDY: CPT

## 2022-07-13 PROCEDURE — 73560 X-RAY EXAM OF KNEE 1 OR 2: CPT | Mod: RT

## 2022-07-13 NOTE — HISTORY OF PRESENT ILLNESS
[Clean/Dry/Intact] : clean, dry and intact [Erythema] : not erythematous [Discharge] : absent of discharge [Swelling] : swollen [Dehiscence] : not dehisced [Neuro Intact] : an unremarkable neurological exam [Vascular Intact] : ~T peripheral vascular exam normal [Hardware in Good Position] : hardware in good position [Good Overall Alignment] : good overall alignment [Doing Well] : is doing well [Excellent Pain Control] : has excellent pain control [No Sign of Infection] : is showing no signs of infection [None] : None [de-identified] : s/p open reduction with internal fixation of right bicondylar tibial plateau fracture. 4/25/22 [de-identified] : 57 y/o F s/p open reduction with internal fixation of right bicondylar tibial plateau fracture. 4/25/22\par \par Since her last visit she was diagnosed with a deep venous thrombosis.  She has been following up with vascular surgery.  She has complaints of pain and swelling of the leg. [de-identified] : Physical Exam:\par General: Well appearing, no acute distress, A&O\par Neurologic: A&Ox3, No focal deficits\par Head: NCAT without abrasions, lacerations, or ecchymosis to head, face, or scalp\par Respiratory: Equal chest wall expansion bilaterally, no accessory muscle use\par Lymphatic: No lymphadenopathy palpated\par Skin: Warm and dry\par Psychiatric: Normal mood and affect\par \par SILT s/s/sp/dp/t\par Fires EHL/FHL/GS/TA\par 2+ DP/PT pulse\par brisk capillary refill [de-identified] : 2 view Plain films of the right knee obtained today show stable fixation of bicondylar tibial plateau fracture [de-identified] : We will continue to allow the patient for full range of motion & weightbearing.  Recommend physical therapy to work on range of motion.  Her fractures continue to heal well.  She will have continued improvement.  Some of her limitations are due to the swelling from the deep venous thrombosis.  If she still having significant symptoms in about a month she can come back for repeat evaluation and repeat x-rays but otherwise she may follow-up as needed.\par \par The patient was given the opportunity to ask questions and all questions were answered to their satisfaction.\par \par Harpreet Gamez MD\par Orthopaedic Trauma Surgeon\par [facility]\par [facility] [place]\par Director Orthopaedic Trauma, [facility]\par \par \par \par

## 2022-07-13 NOTE — PHYSICAL EXAM
[2+] : left 2+ [Ankle Swelling (On Exam)] : present [Ankle Swelling On The Right] : mild [Varicose Veins Of Lower Extremities] : not present [] : not present [Skin Ulcer] : no ulcer [Alert] : alert [Oriented to Person] : oriented to person [Oriented to Place] : oriented to place [Oriented to Time] : oriented to time [Calm] : calm [de-identified] : NAD. well appearing  [FreeTextEntry1] : PT non-palpable due to edema

## 2022-07-13 NOTE — ASSESSMENT
[FreeTextEntry1] : 57 yo female with right subacute popliteal and gastroc vein occlusive DVT. posterior tibial subacute non-occlusive DVT. right femoral vein has resolved. Pt is compliant with Xarelto 20 mg \par \par Pt counseled on results of duplex and above diagnosis.\par Pt counseled to resume activity. Pt counseled on walking for exercise\par Pt counseled to start using compression stockings and elevate the legs \par Continue Xarleto 20 mg for 6 months \par RTC in 3 months for repeat venous duplex\par \par A total of 30 minutes was spent with patient and coordinating care\par \par

## 2022-07-13 NOTE — HISTORY OF PRESENT ILLNESS
[FreeTextEntry1] : 55 yo female PMHx HLD, presents for evaluation of RLE DVT. Pt had an ORIF for right tibial fracture 4/25/22. On 6/28/22 she called her ortho provider because she was having RLE swelling. They sent her for a venous duplex and she was diagnosed with a right femoral, popliteal and posterior tibial DVT. She was started on Xarelto 15 mg BID. She is currently on Xarelto 20 mg. She saw hematology and has no clotting disorders. She has been having less leg swelling since starting Xarelto. Her bother has a factor V leiden mutation.

## 2022-07-14 NOTE — PROGRESS NOTE ADULT - ASSESSMENT
56y Female with PMH of HLD, Acid Reflux , history of obesity , s/p Lap band 2009 , complicated with aspiration PNA and lung abscess in 2020 , was hospitalized and treated with iv Abx x 6 wks ( s/p PICC line placement ) , follows regularly with Pulmonologist, hx of small lungs nodules presenting to the emergency department s/p mechanical fall found to have right proximal tibia fracture and s/p ORIF via ortho.    #R proximal tibia fracture   - s/p post mechanical fall  - s/p right tibial plateau ORIF  POD 2  - pain mgmt  - local wound care  - supportive care  - pt   - primary mgmt per ortho    #HLD   - statin    #History of small lung nodules/ chronic sob / hx of Aspiration PNA / abccess (in 2020)  - previously treated- follows with pulm outpatient - currently with no sob and on room air - cxr- no acute pathology    #Leukocytosis   - likely reactive patient afebrile vitals stable  - no signs or symptoms of infection at this time   - monitor wbc    #DVT prophylaxis  - lovenox SC    patient medically optimized for discharge per primary team  14-Jul-2022 30-Jun-2022

## 2022-08-04 NOTE — REASON FOR VISIT
Social work: spoke to Fern and they states still no answer from [employer]. Will need lola, Rx and discharge order for snf.  Dearl Vishal ferguson [Initial Consultation] : an initial consultation for

## 2022-08-10 ENCOUNTER — APPOINTMENT (OUTPATIENT)
Dept: ORTHOPEDIC SURGERY | Facility: CLINIC | Age: 57
End: 2022-08-10

## 2022-08-10 VITALS
HEART RATE: 91 BPM | HEIGHT: 66 IN | WEIGHT: 250 LBS | TEMPERATURE: 97.9 F | DIASTOLIC BLOOD PRESSURE: 88 MMHG | BODY MASS INDEX: 40.18 KG/M2 | SYSTOLIC BLOOD PRESSURE: 144 MMHG

## 2022-08-10 PROCEDURE — 73560 X-RAY EXAM OF KNEE 1 OR 2: CPT | Mod: RT

## 2022-08-10 PROCEDURE — 99214 OFFICE O/P EST MOD 30 MIN: CPT

## 2022-08-10 NOTE — PHYSICAL EXAM
[de-identified] : Physical Exam:\par General: Well appearing, no acute distress, A&O\par Neurologic: A&Ox3, No focal deficits\par Head: NCAT without abrasions, lacerations, or ecchymosis to head, face, or scalp\par Respiratory: Equal chest wall expansion bilaterally, no accessory muscle use\par Lymphatic: No lymphadenopathy palpated\par Skin: Warm and dry\par Psychiatric: Normal mood and affect\par \par RLE:\par SILT s/s/sp/dp/t\par Fires EHL/FHL/GS/TA\par brisk capillary refill\par Well-healed surgical scars\par Knee range of motion from 0 to 75 degrees\par Positive tenderness to palpation along the medial joint line and along the patellar tendon\par 2+ edema [de-identified] : 2 view plain films of the right knee were obtained today which show continued healing of the bicondylar tibial plateau fracture with no loss of alignment

## 2022-08-10 NOTE — HISTORY OF PRESENT ILLNESS
[de-identified] : The patient is a pleasant 56-year-old female presents with her  today for evaluation of right knee pain.  She is status post tibial plateau fracture several months ago.  She is continuing to have pain.  She is ambulating with a walker.  She is going to physical therapy.  Her postoperative recovery was complicated by a deep venous thrombosis and lower extremity swelling.  She is still following up with hematology and vascular surgery.  The patient states the pain is made worse with activity and relieved with rest.  Aching, 4 out of 10. [Bending] : worsened by bending [Lifting] : worsened by lifting [Weight Bearing] : worsened by weight bearing [Recumbency] : relieved by recumbency [Rest] : relieved by rest

## 2022-08-10 NOTE — DISCUSSION/SUMMARY
[de-identified] : 56-year-old female with continued right knee pain status post tibial plateau ORIF.  She does have very specific medial joint line tenderness which is concerning for intra-articular pathology.  I would continue to recommend physical therapy to work on range of motion, edema control, strengthening, gait training.  She is likely still having significant residual swelling from her deep venous thrombosis and is still under treatment for that.  Given her specific joint line pain and concern for articular injury, MRI with metal subtraction would be useful for further evaluation for internal derangement of the knee.  When the MRI is complete she will give us a call.  She is also having symptoms that are somewhat concerning for a mild case of complex regional pain syndrome given her swelling, pain, hypersensitivity to touch.  If this does not improve on its own, referral to pain management would likely be useful as well.\par \par The patient was given the opportunity to ask questions and all questions were answered to their satisfaction.\par \par Harpreet Gamez MD\par Orthopaedic Trauma Surgeon\par [facility]\par [facility] [place]\par Director Orthopaedic Trauma, [facility]\par \par \par \par

## 2022-08-19 ENCOUNTER — OUTPATIENT (OUTPATIENT)
Dept: OUTPATIENT SERVICES | Facility: HOSPITAL | Age: 57
LOS: 1 days | End: 2022-08-19

## 2022-08-19 ENCOUNTER — APPOINTMENT (OUTPATIENT)
Dept: MRI IMAGING | Facility: CLINIC | Age: 57
End: 2022-08-19

## 2022-08-19 DIAGNOSIS — S82.141D DISPLACED BICONDYLAR FRACTURE OF RIGHT TIBIA, SUBSEQUENT ENCOUNTER FOR CLOSED FRACTURE WITH ROUTINE HEALING: ICD-10-CM

## 2022-08-19 PROCEDURE — 73721 MRI JNT OF LWR EXTRE W/O DYE: CPT | Mod: 26,RT

## 2022-08-23 ENCOUNTER — TRANSCRIPTION ENCOUNTER (OUTPATIENT)
Age: 57
End: 2022-08-23

## 2022-08-31 ENCOUNTER — APPOINTMENT (OUTPATIENT)
Dept: ORTHOPEDIC SURGERY | Facility: CLINIC | Age: 57
End: 2022-08-31

## 2022-09-22 ENCOUNTER — APPOINTMENT (OUTPATIENT)
Dept: PHYSICAL MEDICINE AND REHAB | Facility: CLINIC | Age: 57
End: 2022-09-22

## 2022-09-22 VITALS
HEIGHT: 66 IN | BODY MASS INDEX: 40.18 KG/M2 | RESPIRATION RATE: 14 BRPM | DIASTOLIC BLOOD PRESSURE: 83 MMHG | SYSTOLIC BLOOD PRESSURE: 135 MMHG | HEART RATE: 77 BPM | WEIGHT: 250 LBS

## 2022-09-22 DIAGNOSIS — Z87.39 PERSONAL HISTORY OF OTHER DISEASES OF THE MUSCULOSKELETAL SYSTEM AND CONNECTIVE TISSUE: ICD-10-CM

## 2022-09-22 DIAGNOSIS — Z78.9 OTHER SPECIFIED HEALTH STATUS: ICD-10-CM

## 2022-09-22 PROCEDURE — 99204 OFFICE O/P NEW MOD 45 MIN: CPT

## 2022-09-22 RX ORDER — TRAMADOL HYDROCHLORIDE 50 MG/1
50 TABLET, COATED ORAL
Qty: 42 | Refills: 0 | Status: COMPLETED | COMMUNITY
Start: 2022-09-16 | End: 2022-09-22

## 2022-09-22 RX ORDER — OXYCODONE HYDROCHLORIDE 30 MG/1
TABLET ORAL
Refills: 0 | Status: COMPLETED | COMMUNITY
End: 2022-09-22

## 2022-09-22 RX ORDER — TRAMADOL HYDROCHLORIDE 50 MG/1
50 TABLET, COATED ORAL EVERY 8 HOURS
Qty: 21 | Refills: 0 | Status: COMPLETED | COMMUNITY
Start: 2022-06-23 | End: 2022-09-22

## 2022-09-22 RX ORDER — TRAMADOL HYDROCHLORIDE 50 MG/1
50 TABLET, COATED ORAL
Qty: 21 | Refills: 0 | Status: COMPLETED | COMMUNITY
Start: 2022-08-02 | End: 2022-09-22

## 2022-09-22 RX ORDER — TRAMADOL HYDROCHLORIDE 50 MG/1
50 TABLET, COATED ORAL 4 TIMES DAILY
Qty: 30 | Refills: 0 | Status: COMPLETED | COMMUNITY
Start: 2022-07-13 | End: 2022-09-22

## 2022-09-22 RX ORDER — OXYCODONE 5 MG/1
5 TABLET ORAL
Qty: 42 | Refills: 0 | Status: COMPLETED | COMMUNITY
Start: 2022-05-27 | End: 2022-09-22

## 2022-09-22 NOTE — PHYSICAL EXAM
[FreeTextEntry1] : NAD\par A&Ox3\par Obese\par Right Knee\par Inspection\par Well healed surgical incisions C/D/I\par No erythema\par No increased temperature\par PROM -25' EXT - 55' FLEXION\par MJLT +\par Angel's deferred\par SILT\par Right Ankle\par PROM -5-10' DF\par Gait antalgic

## 2022-09-22 NOTE — HISTORY OF PRESENT ILLNESS
[FreeTextEntry1] : 57 y.o. F w/ h/o right tibial plateau fx s/o ORIF (4/25/22) referred to office for c/o continued right knee pain.  Pt.'s surgery complicated by extensive DVT right leg (distal femoral vein, popliteal vein, proximal posterior tibial vein and gastrocnemius vein) which was found in July.  Pt. is still AC on Xarelto.  No coagulopathy work up was indicated at the time.  Pt. states that her pain has decreased and swelling as improved.  Wears compression stalking.  Still walking w/ aide of RW.  MRI R knee done and reviewed below.  Still describe some increased sensitivity to R leg.  Difficulty tolerating bed sheets.  Burning discomfort has subsided.  Takes OTC acetaminophen or Rx tramadol.

## 2022-09-22 NOTE — DATA REVIEWED
[MRI] : MRI [FreeTextEntry1] : MRI R Knee:  Limited exam secondary to susceptibility artifact despite metal reduction techniques and motion.\par Status post ORIF of the proximal tibia. Re- demonstration of proximal tibia and fibular fractures.\par Thrombosis of the popliteal vein.\par Diffuse intramuscular edema along the posterior knee joint and proximal tibia.\par Mild to moderate patellar chondral loss.

## 2022-09-22 NOTE — ASSESSMENT
[FreeTextEntry1] : 57 y.o. F w/ h/o right tibial plateau fx s/o ORIF (4/25/22) complicated by extensive DVT right leg (distal femoral vein, popliteal vein, proximal posterior tibial vein and gastrocnemius vein) w/ likely interval development of arthrofibrosis in the absence of CRPS Type I.  I spent most of today's office visit (35 min) reviewing the patient's MRI, discussing etiology, pathogenesis and further non-operative management.  Pt. does not meet Budapest Criteria for CRPS as there are little sensory changes, no vasomotor or sudomotor changes.  Explained that patient's decreasing leg pain and sensitivity may be occurring as her LE DVTs stabilize/resolve on the Xarelto.  Recommended continued P.T. for ROM, stretching and stabilization exercises.  CSI not indicated at this time.  Pt. and  are in agreement with plan.  All questions answered.  RTC prn.

## 2022-10-11 ENCOUNTER — OUTPATIENT (OUTPATIENT)
Dept: OUTPATIENT SERVICES | Facility: HOSPITAL | Age: 57
LOS: 1 days | Discharge: HOME | End: 2022-10-11

## 2022-10-11 DIAGNOSIS — I82.90 ACUTE EMBOLISM AND THROMBOSIS OF UNSPECIFIED VEIN: ICD-10-CM

## 2022-10-14 ENCOUNTER — APPOINTMENT (OUTPATIENT)
Dept: HEMATOLOGY ONCOLOGY | Facility: CLINIC | Age: 57
End: 2022-10-14

## 2022-10-14 VITALS
WEIGHT: 250 LBS | SYSTOLIC BLOOD PRESSURE: 159 MMHG | DIASTOLIC BLOOD PRESSURE: 77 MMHG | BODY MASS INDEX: 40.18 KG/M2 | HEIGHT: 66 IN | OXYGEN SATURATION: 97 % | HEART RATE: 76 BPM

## 2022-10-14 PROCEDURE — 99215 OFFICE O/P EST HI 40 MIN: CPT

## 2022-10-14 NOTE — PHYSICAL EXAM
[de-identified] : Partial weight bearing [Normal] : affect appropriate [de-identified] : Ambulating using crutches [de-identified] : swelling of right LE

## 2022-10-14 NOTE — ADDENDUM
[FreeTextEntry1] : Documented by Dennise Carranza acting as scribe for Dr. Padilla on 10/14/2022 \par \par All Medical record entries made by the Scribe were at my, Dr. Padilla's, direction and personally dictated by me on 10/14/2022 . I have reviewed the chart and agree that the record accurately reflects my personal performance of the history, physical exam, assessment and plan. I have also personally directed, reviewed, and agreed with the discharge instructions.\par

## 2022-10-14 NOTE — HISTORY OF PRESENT ILLNESS
[de-identified] : 56 year old F w/ no PMHx  S/p open reduction with internal fixation of right bicondylar tibial plateau fracture on 4/25/22 at [facility]. She was admitted to E/D on 6/28/22 c/o RLE swelling and pain for 1 week. US Showed  RLE DVT on 6/28/22. \par \par US venous doppler 6/28/22 There is thrombus noted in the distal femoral vein, popliteal vein proximal posterior tibial vein without flow or compressibility. Thrombus is also noted in the right gastrocnemius vein. There is no Baker's cyst. RLE Postive for DVT. \par \par \par FMHx: \par Brother is positive for factor V Leiden gene mutation. Had a blood clot at 30. On life time anticoagulation\par Brother has blood clot in arm; Negative for factor V Leiden gene mutation.\par \par Orthopaedic Trauma Surgeon; Harpreet Gamez MD\par  [de-identified] : Patient presents for a followup with  [facility]. Ambulating using crutches\par \par Today patient with right leg discomfort has improved using compression socks. Reports soreness and tightness of upper leg.\par Continues to do PT.\par Continues on Xarelto\par \par \par Patient fell off of porch and had a tibial fracture. s/p open reduction with internal fixation of right bicondylar tibial plateau fracture. 4/25/22. \par Started on Lovenox since 4/25/22 after surgery, stopped on 5/24/22 ( when she was discharged from Rehab) \par S/p 4 weeks of rehab on 5/24/22\par Partial weight bearing since 6/13/22\par Noted to have a Rt LL DVT at  and has been On Xarelto since 6/28/22\par Patient reports pain, numbness, tingling, swelling, tightness of RLE. Admits having feeling lost in the right tibial region 2/2 to surgery. She is slowly regaining feeling. \par

## 2022-10-14 NOTE — ASSESSMENT
[FreeTextEntry1] : 57 year old F w/ no PMHx  S/p open reduction with internal fixation of right bicondylar tibial plateau fracture on 4/25/22 at [facility]. She was in Rehab post surgery Was discharged on 5/24/22. She was on Lovenox 40 mg in Rehab \par She was admitted to E/D on 6/28/22 c/o RLE swelling and pain for 1 week. US Showed  RLE DVT on 6/28/22. \par \par -Patient with provoked Rt LE DVT DVT likely due to immobilization. post surgery \par -Discussed provoked vs. unprovoked PE/DVT\par - Brother with FVL Mutation  ( unclear if homozygous or heterozygous) and is on Life long AC. Patient was tested for FVL 30 + y ago and was noted to be negative per patient \par -Currently on Xarleto 20 mg daily, right LE swelling advised to continue xarelto for now\par -Advised to repeat LE doppler, patient will obtain perform doppler the Dr. Dumont.\par -F/u in 3-4  months\par \par

## 2022-10-21 NOTE — PROCEDURE
----- Message from Glory Coker RN sent at 10/21/2022  9:49 AM CDT -----  Normal results - TY!    ----- Message -----  From: Concepcion Boo MD  Sent: 10/20/2022   7:06 PM CDT  To: Gareth Quiroga Phone Nurse Message Pool [place]    Please notify the patient of normal PAP results.       [FreeTextEntry1] : Studies: \par \par 6/28/22 RLE venous duplex: femoral vein, popliteal vein and gastroc vein DVT \par \par 7/13/22 RLE venous duplex: subacute popliteal and gastroc vein occlusive DVT. posterior tibial subacute non-occlusive DVT.

## 2022-10-26 ENCOUNTER — APPOINTMENT (OUTPATIENT)
Dept: VASCULAR SURGERY | Facility: CLINIC | Age: 57
End: 2022-10-26

## 2022-10-26 VITALS
OXYGEN SATURATION: 97 % | RESPIRATION RATE: 16 BRPM | SYSTOLIC BLOOD PRESSURE: 128 MMHG | HEART RATE: 88 BPM | HEIGHT: 66 IN | DIASTOLIC BLOOD PRESSURE: 78 MMHG | TEMPERATURE: 97.1 F

## 2022-10-26 PROCEDURE — 93971 EXTREMITY STUDY: CPT

## 2022-10-26 PROCEDURE — 99213 OFFICE O/P EST LOW 20 MIN: CPT

## 2022-11-01 NOTE — HISTORY OF PRESENT ILLNESS
[FreeTextEntry1] : 7/13/22: 55 yo female PMHx HLD, presents for evaluation of RLE DVT. Pt had an ORIF for right tibial fracture 4/25/22. On 6/28/22 she called her ortho provider because she was having RLE swelling. They sent her for a venous duplex and she was diagnosed with a right femoral, popliteal and posterior tibial DVT. She was started on Xarelto 15 mg BID. She is currently on Xarelto 20 mg. She saw hematology and has no clotting disorders. She has been having less leg swelling since starting Xarelto. Her bother has a factor V leiden mutation. \par \par 10/26/22: Pt continues to have severe swelling in her right leg. She has been complaint with Xarelto. She has been using compression stockings. She denies any SOB or chest pain.

## 2022-11-01 NOTE — PHYSICAL EXAM
[2+] : left 2+ [Ankle Swelling (On Exam)] : present [Ankle Swelling On The Right] : mild [Alert] : alert [Oriented to Person] : oriented to person [Oriented to Place] : oriented to place [Oriented to Time] : oriented to time [Calm] : calm [Varicose Veins Of Lower Extremities] : not present [] : not present [Skin Ulcer] : no ulcer [de-identified] : NAD. well appearing  [FreeTextEntry1] : PT non-palpable due to edema

## 2022-11-01 NOTE — PROCEDURE
[FreeTextEntry1] : Studies: \par \par 6/28/22 RLE venous duplex: femoral vein, popliteal vein and gastroc vein DVT \par \par 7/13/22 RLE venous duplex: subacute popliteal and gastroc vein occlusive DVT. posterior tibial subacute non-occlusive DVT. \par \par 10/26/22 RLE venous duplex: unable to assess for DVT due to poor image quality

## 2022-11-01 NOTE — ASSESSMENT
[FreeTextEntry1] : 55 yo female with right subacute popliteal and gastroc vein occlusive DVT. posterior tibial subacute non-occlusive DVT. right femoral vein has resolved. Pt is compliant with Xarelto 20 mg \par \par Pt counseled on results of duplex and above diagnosis.\par RLE UNNA boot applied \par Pt counseled to resume activity. Pt counseled on walking for exercise\par Continue Xarleto 20 mg \par RTC in 1 week for bandage change and to monitor symptoms \par \par A total of 20 minutes was spent with patient and coordinating care\par \par

## 2022-11-02 ENCOUNTER — APPOINTMENT (OUTPATIENT)
Dept: VASCULAR SURGERY | Facility: CLINIC | Age: 57
End: 2022-11-02

## 2022-11-02 VITALS
DIASTOLIC BLOOD PRESSURE: 70 MMHG | OXYGEN SATURATION: 97 % | TEMPERATURE: 97.3 F | HEART RATE: 84 BPM | RESPIRATION RATE: 16 BRPM | SYSTOLIC BLOOD PRESSURE: 164 MMHG

## 2022-11-02 PROCEDURE — 99213 OFFICE O/P EST LOW 20 MIN: CPT

## 2022-11-04 NOTE — PHYSICAL EXAM
[2+] : left 2+ [Ankle Swelling (On Exam)] : present [Ankle Swelling On The Right] : mild [Alert] : alert [Oriented to Person] : oriented to person [Oriented to Place] : oriented to place [Oriented to Time] : oriented to time [Calm] : calm [Varicose Veins Of Lower Extremities] : not present [] : not present [Skin Ulcer] : no ulcer [de-identified] : NAD. well appearing  [FreeTextEntry1] : PT non-palpable due to edema

## 2022-11-04 NOTE — ADDENDUM
[FreeTextEntry1] : Patient presents with lymphedema secondary to venous insufficiency. The patient has been compliant with conservative therapies including compression 20-30mmhg, exercise and leg elevation at rest for over a month.  Despite these therapies symptoms continue to progress and extend into the truncal region. Early signs of hyperpigmentation noted in both calves. I am now recommending a lymphedema pump vended by [employer].\par

## 2022-11-04 NOTE — HISTORY OF PRESENT ILLNESS
[FreeTextEntry1] : 7/13/22: 55 yo female PMHx HLD, presents for evaluation of RLE DVT. Pt had an ORIF for right tibial fracture 4/25/22. On 6/28/22 she called her ortho provider because she was having RLE swelling. They sent her for a venous duplex and she was diagnosed with a right femoral, popliteal and posterior tibial DVT. She was started on Xarelto 15 mg BID. She is currently on Xarelto 20 mg. She saw hematology and has no clotting disorders. She has been having less leg swelling since starting Xarelto. Her bother has a factor V leiden mutation. \par \par 10/26/22: Pt continues to have severe swelling in her right leg. She has been complaint with Xarelto. She has been using compression stockings. She denies any SOB or chest pain. \par \par 11/2/22: Pt had RLE UNNA boot on for the past week. She feels her edema has decreased. She has been complaint with Xarelto. She got measured for her lymphedema massage pump yesterday. She denies any SOB or chest pain.

## 2022-11-04 NOTE — ASSESSMENT
[FreeTextEntry1] : 58 yo female with right subacute popliteal and gastroc vein occlusive DVT. posterior tibial subacute non-occlusive DVT. right femoral vein has resolved. Pt is compliant with Xarelto 20 mg \par \par Pt counseled again on results of duplex and above diagnosis.\par Pt counseled to resume compression stockings on legs bilaterally \par Pt counseled to resume activity. Pt counseled on walking for exercise\par Continue Xarleto 20 mg \par RTC in 3 months for repeat RLE venous duplex \par \par A total of 20 minutes was spent with patient and coordinating care\par \par

## 2023-01-04 ENCOUNTER — APPOINTMENT (OUTPATIENT)
Dept: VASCULAR SURGERY | Facility: CLINIC | Age: 58
End: 2023-01-04
Payer: COMMERCIAL

## 2023-01-04 VITALS
DIASTOLIC BLOOD PRESSURE: 71 MMHG | HEIGHT: 66 IN | OXYGEN SATURATION: 95 % | RESPIRATION RATE: 16 BRPM | HEART RATE: 75 BPM | SYSTOLIC BLOOD PRESSURE: 139 MMHG | TEMPERATURE: 97.3 F

## 2023-01-04 PROCEDURE — 93971 EXTREMITY STUDY: CPT

## 2023-01-04 PROCEDURE — 99213 OFFICE O/P EST LOW 20 MIN: CPT

## 2023-01-09 NOTE — ASSESSMENT
[FreeTextEntry1] : 56 yo female with resolution of right popliteal, posterior tibial and gastroc vein DVT. . Pt is compliant with Xarelto 20 mg \par \par Pt counseled again on results of duplex and above diagnosis.\par Pt counseled to continue using compression stockings on legs bilaterally \par Pt counseled to resume activity. Pt counseled on walking for exercise\par Discontinue Xarleto 20 mg \par RTC in 3 months for repeat RLE venous duplex \par \par A total of 20 minutes was spent with patient and coordinating care\par \par

## 2023-01-09 NOTE — HISTORY OF PRESENT ILLNESS
[FreeTextEntry1] : 7/13/22: 57 yo female PMHx HLD, presents for evaluation of RLE DVT. Pt had an ORIF for right tibial fracture 4/25/22. On 6/28/22 she called her ortho provider because she was having RLE swelling. They sent her for a venous duplex and she was diagnosed with a right femoral, popliteal and posterior tibial DVT. She was started on Xarelto 15 mg BID. She is currently on Xarelto 20 mg. She saw hematology and has no clotting disorders. She has been having less leg swelling since starting Xarelto. Her bother has a factor V leiden mutation. \par \par 10/26/22: Pt continues to have severe swelling in her right leg. She has been complaint with Xarelto. She has been using compression stockings. She denies any SOB or chest pain. \par \par 11/2/22: Pt had RLE UNNA boot on for the past week. She feels her edema has decreased. She has been complaint with Xarelto. She got measured for her lymphedema massage pump yesterday. She denies any SOB or chest pain. \par \par 1/4/23: Pt doing okay since last visit. She continues to have edema in her right leg. She feels the edema is decreasing her range of motion. She has been complaint with Xarelto. She has been using compression stockings. She denies any SOB or chest pain.

## 2023-01-09 NOTE — PROCEDURE
[FreeTextEntry1] : Studies: \par \par 6/28/22 RLE venous duplex: femoral vein, popliteal vein and gastroc vein DVT \par \par 7/13/22 RLE venous duplex: subacute popliteal and gastroc vein occlusive DVT. posterior tibial subacute non-occlusive DVT. \par \par 10/26/22 RLE venous duplex: unable to assess for DVT due to poor image quality \par \par 1/9/23 RLE venous duplex: No DVT or insufficiency

## 2023-01-09 NOTE — ADDENDUM
[FreeTextEntry1] : Patient presents with lymphedema secondary to venous insufficiency. The patient has been compliant with conservative therapies including compression 20-30mmhg, exercise and leg elevation at rest for over a month.  Despite these therapies symptoms continue to progress and extend into the truncal region. Early signs of hyperpigmentation noted in both calves. I am now recommending a lymphedema pump vended by [employer].\par

## 2023-01-09 NOTE — PHYSICAL EXAM
[2+] : left 2+ [Ankle Swelling (On Exam)] : present [Ankle Swelling On The Right] : mild [Alert] : alert [Oriented to Person] : oriented to person [Oriented to Place] : oriented to place [Oriented to Time] : oriented to time [Calm] : calm [Varicose Veins Of Lower Extremities] : not present [] : not present [Skin Ulcer] : no ulcer [de-identified] : NAD. well appearing  [FreeTextEntry1] : PT non-palpable due to edema

## 2023-02-02 ENCOUNTER — OUTPATIENT (OUTPATIENT)
Dept: OUTPATIENT SERVICES | Facility: HOSPITAL | Age: 58
LOS: 1 days | Discharge: HOME | End: 2023-02-02

## 2023-02-02 DIAGNOSIS — I82.90 ACUTE EMBOLISM AND THROMBOSIS OF UNSPECIFIED VEIN: ICD-10-CM

## 2023-02-08 ENCOUNTER — APPOINTMENT (OUTPATIENT)
Dept: HEMATOLOGY ONCOLOGY | Facility: CLINIC | Age: 58
End: 2023-02-08
Payer: COMMERCIAL

## 2023-02-08 VITALS
SYSTOLIC BLOOD PRESSURE: 156 MMHG | OXYGEN SATURATION: 96 % | HEIGHT: 66 IN | WEIGHT: 258 LBS | TEMPERATURE: 97.2 F | HEART RATE: 76 BPM | DIASTOLIC BLOOD PRESSURE: 77 MMHG | BODY MASS INDEX: 41.46 KG/M2

## 2023-02-08 PROCEDURE — 99215 OFFICE O/P EST HI 40 MIN: CPT

## 2023-02-08 NOTE — PHYSICAL EXAM
[Normal] : affect appropriate [de-identified] : Ambulating using crutches [de-identified] : swelling of right LE

## 2023-02-08 NOTE — HISTORY OF PRESENT ILLNESS
[de-identified] : 56 year old F w/ no PMHx  S/p open reduction with internal fixation of right bicondylar tibial plateau fracture on 4/25/22 at [facility]. She was admitted to E/D on 6/28/22 c/o RLE swelling and pain for 1 week. US Showed  RLE DVT on 6/28/22. \par \par Patient fell off of porch and had a tibial fracture. s/p open reduction with internal fixation of right bicondylar tibial plateau fracture. 4/25/22. \par Started on Lovenox since 4/25/22 after surgery, stopped on 5/24/22 ( when she was discharged from Rehab) \par S/p 4 weeks of rehab on 5/24/22\par Partial weight bearing since 6/13/22\par Noted to have a Rt LL DVT at  and has been On Xarelto since 6/28/22\par Patient reports pain, numbness, tingling, swelling, tightness of RLE. Admits having feeling lost in the right tibial region 2/2 to surgery. She is slowly regaining feeling. \par \par US venous doppler 6/28/22 There is thrombus noted in the distal femoral vein, popliteal vein proximal posterior tibial vein without flow or compressibility. Thrombus is also noted in the right gastrocnemius vein. There is no Baker's cyst. RLE Postive for DVT. \par \par \par FMHx: \par Brother is positive for factor V Leiden gene mutation. Had a blood clot at 30. On life time anticoagulation\par Brother has blood clot in arm; Negative for factor V Leiden gene mutation.\par \par Orthopaedic Trauma Surgeon; Harpreet Gamez MD\par  [de-identified] : Patient presents for a followup with  [facility]. Ambulating using crutches\par \par \par Doppler per VASCULAR 1/4/23 : No evidence of DVT or superficial thrombophlebitis in the visualized vessels   The veins are compressible and free of thrombosis . \par She saw Dr Dumont last month and was taken off xarelto \par \par \par

## 2023-02-08 NOTE — ASSESSMENT
[FreeTextEntry1] : 57 year old F w/ no PMHx  S/p open reduction with internal fixation of right bicondylar tibial plateau fracture on 4/25/22 at [facility]. She was in Rehab post surgery Was discharged on 5/24/22. She was on Lovenox 40 mg in Rehab \par She was admitted to E/D on 6/28/22 c/o RLE swelling and pain for 1 week. US Showed  RLE DVT on 6/28/22. \par \par -Patient with provoked Rt LE DVT DVT likely due to immobilization. post surgery \par -Likely Provoked DVT\par - Brother with FVL Mutation  ( unclear if homozygous or heterozygous) and is on Life long AC. Patient was tested for FVL 30 + y ago and was noted to be negative per patient \par -Discontinued xarelto  in Jan 2023\par Doppler per VASCULAR 1/4/23 : No evidence of DVT or superficial thrombophlebitis in the visualized vessels   The veins are compressible and free of thrombosis . \par - she will f/u with dr acosta in APril 2023 for repeat Doppler \par - _ LE pain undergoing lymphedema therapy \par -F/u PRN \par

## 2023-02-16 ENCOUNTER — APPOINTMENT (OUTPATIENT)
Dept: PHYSICAL MEDICINE AND REHAB | Facility: CLINIC | Age: 58
End: 2023-02-16
Payer: COMMERCIAL

## 2023-02-16 PROCEDURE — 99214 OFFICE O/P EST MOD 30 MIN: CPT

## 2023-02-16 NOTE — REASON FOR VISIT
[Initial Evaluation] : an initial evaluation [FreeTextEntry1] : Lymphedema, Knee pain/Impaired mobility

## 2023-02-16 NOTE — PHYSICAL EXAM
[FreeTextEntry1] : Gen: Patient is A&O x 3, NAD\par HEENT: EOMI, hearing grossly normal\par Resp: regular, non - labored\par CV: pulses regular\par Skin: no rashes, erythema\par Lymph: +Edema in RLE \par Inspection: no instability \par ROM:  severely restricted in right knee flexion \par Palpation:no tenderness to palpation\par Sensation: intact to light touch\par Reflexes: 1+ and symmetric throughout\par Strength: 5/5 throughout\par Special tests: +Right stemmers sign\par Gait: antalgic, ambulates with rolling walker \par \par

## 2023-02-16 NOTE — HISTORY OF PRESENT ILLNESS
[FreeTextEntry1] : Ms. Ribeiro is 57 year old F w/ no PMHx S/p open reduction with internal fixation of right bicondylar tibial plateau fracture on 4/25/22 at [facility]. She was in Rehab post surgery Was discharged on 5/24/22. She was on Lovenox 40 mg in Rehab.  She was admitted to E/D on 6/28/22 c/o RLE swelling and pain for 1 week. US Showed RLE DVT on 6/28/22.  Was on xarelto, discontinued in Jan 2023.  \par \par \par Hematology oncology notes reviewed she reports that since her trauma she has had swelling in her right lower extremity.  Most the swelling is below the knee.  Denies any swelling in her abdominal groin region.  Denies any abnormal weight loss.  Denies any night sweats fevers or chills.  Has had difficulty with her gait and ambulation chronically since her surgery.  Difficulty with knee flexion, currently ambulating with rolling walker.

## 2023-02-16 NOTE — ASSESSMENT
[FreeTextEntry1] : 57 year old female presenting for evaluation.\par \par #Lymphedema:\par -Case discussed with Melba Farmer-PT, patient has compression garments and reduction with CDT\par -Lymphedema education provided to patient\par -Continue compression garments\par -Recommend pneumatic compression pump \par \par #Impaired mobility:\par -Chronic impaired mobility\par -Start physical therapy for gait and balance training\par -Follow up with orthopedics\par \par Follow up in 6 weeks.

## 2023-02-16 NOTE — DATA REVIEWED
[FreeTextEntry1] : Doppler per VASCULAR 1/4/23 : No evidence of DVT or superficial thrombophlebitis in the visualized vessels The veins are compressible and free of thrombosis. \par

## 2023-03-13 ENCOUNTER — APPOINTMENT (OUTPATIENT)
Dept: VASCULAR SURGERY | Facility: CLINIC | Age: 58
End: 2023-03-13
Payer: COMMERCIAL

## 2023-03-13 VITALS
HEART RATE: 72 BPM | DIASTOLIC BLOOD PRESSURE: 74 MMHG | TEMPERATURE: 97.3 F | OXYGEN SATURATION: 96 % | HEIGHT: 66 IN | RESPIRATION RATE: 15 BRPM | SYSTOLIC BLOOD PRESSURE: 150 MMHG

## 2023-03-13 PROCEDURE — 93971 EXTREMITY STUDY: CPT

## 2023-03-13 PROCEDURE — 99213 OFFICE O/P EST LOW 20 MIN: CPT

## 2023-03-13 NOTE — PROCEDURE
[FreeTextEntry1] : Studies: \par \par 6/28/22 RLE venous duplex: femoral vein, popliteal vein and gastroc vein DVT \par \par 7/13/22 RLE venous duplex: subacute popliteal and gastroc vein occlusive DVT. posterior tibial subacute non-occlusive DVT. \par \par 10/26/22 RLE venous duplex: unable to assess for DVT due to poor image quality \par \par 1/9/23 RLE venous duplex: No DVT or insufficiency \par \par 3/13/23 RLE venous duplex: No acute DVT in femoral or popliteal vein. Unable to assess for chronic wall thickness due to poor image quality.

## 2023-03-13 NOTE — ASSESSMENT
[FreeTextEntry1] : 56 yo female hx of right femoral and popliteal DVT after ORIF in April 2022. No clotting disorders according to hematology. No femoral or popliteal DVT \par \par Pt counseled again on results of duplex and above diagnosis.\par Pt counseled to continue using compression stockings on legs bilaterally \par Pt counseled to resume activity. Pt counseled on walking for exercise\par Continue Aspirn 81 mg \par RTC in 3 months for repeat RLE venous duplex \par \par A total of 20 minutes was spent with patient and coordinating care\par \par

## 2023-03-13 NOTE — HISTORY OF PRESENT ILLNESS
[FreeTextEntry1] : 7/13/22: 57 yo female PMHx HLD, presents for evaluation of RLE DVT. Pt had an ORIF for right tibial fracture 4/25/22. On 6/28/22 she called her ortho provider because she was having RLE swelling. They sent her for a venous duplex and she was diagnosed with a right femoral, popliteal and posterior tibial DVT. She was started on Xarelto 15 mg BID. She is currently on Xarelto 20 mg. She saw hematology and has no clotting disorders. She has been having less leg swelling since starting Xarelto. Her bother has a factor V leiden mutation. \par \par 10/26/22: Pt continues to have severe swelling in her right leg. She has been complaint with Xarelto. She has been using compression stockings. She denies any SOB or chest pain. \par \par 11/2/22: Pt had RLE UNNA boot on for the past week. She feels her edema has decreased. She has been complaint with Xarelto. She got measured for her lymphedema massage pump yesterday. She denies any SOB or chest pain. \par \par 1/4/23: Pt doing okay since last visit. She continues to have edema in her right leg. She feels the edema is decreasing her range of motion. She has been compliant with Xarelto. She has been using compression stockings. She denies any SOB or chest pain. \par \par 3/13/23: Pt returns today with increased RLE edema and burning pain. Pt states last week her mid calf to knee became more swollen. She started to have burning pain in the area, worse at posterior knee. She was going to lymphedema massage in person but stopped a few months ago to start gait training physical therapy. She has been compliant with compression stockings. She stopped Xarelto at last visit and takes aspirin now.

## 2023-03-13 NOTE — ADDENDUM
[FreeTextEntry1] : Patient presents with lymphedema secondary to venous insufficiency. The patient has been compliant with conservative therapies including compression 20-30mmhg, exercise and leg elevation at rest for over a month.  Despite these therapies symptoms continue to progress and extend into the truncal region. Early signs of hyperpigmentation noted in both calves. I am now recommending a lymphedema pump vended by [employer].\par

## 2023-03-13 NOTE — PHYSICAL EXAM
[2+] : left 2+ [Ankle Swelling (On Exam)] : present [Ankle Swelling On The Right] : mild [Alert] : alert [Oriented to Person] : oriented to person [Oriented to Place] : oriented to place [Oriented to Time] : oriented to time [Calm] : calm [Varicose Veins Of Lower Extremities] : not present [] : not present [Skin Ulcer] : no ulcer [de-identified] : NAD. well appearing  [FreeTextEntry1] : PT non-palpable due to edema

## 2023-03-30 ENCOUNTER — APPOINTMENT (OUTPATIENT)
Dept: PHYSICAL MEDICINE AND REHAB | Facility: CLINIC | Age: 58
End: 2023-03-30
Payer: COMMERCIAL

## 2023-03-30 VITALS
SYSTOLIC BLOOD PRESSURE: 181 MMHG | DIASTOLIC BLOOD PRESSURE: 82 MMHG | BODY MASS INDEX: 41.78 KG/M2 | HEIGHT: 66 IN | RESPIRATION RATE: 14 BRPM | WEIGHT: 260 LBS | HEART RATE: 83 BPM

## 2023-03-30 PROCEDURE — 99214 OFFICE O/P EST MOD 30 MIN: CPT

## 2023-03-30 NOTE — DATA REVIEWED
[FreeTextEntry1] : 3/13/23 RLE venous duplex: No acute DVT in femoral or popliteal vein. Unable to assess for chronic wall thickness due to poor image quality. \par

## 2023-03-30 NOTE — HISTORY OF PRESENT ILLNESS
[FreeTextEntry1] : Ms. Ribeiro is 57 year old F w/ no PMHx S/p open reduction with internal fixation of right bicondylar tibial plateau fracture on 4/25/22 at [facility]. She was in Rehab post surgery Was discharged on 5/24/22. She was on Lovenox 40 mg in Rehab.  She was admitted to E/D on 6/28/22 c/o RLE swelling and pain for 1 week. US Showed RLE DVT on 6/28/22.  Was on xarelto, discontinued in Jan 2023.  \par \par Since her last visit she has been performing physical therapy.  She reports that she is improving with her gait mobility.  No longer using rolling walker.  Using axillary crutches and sometimes occasionally cane.\par \par She reports that since stopping lymphedema therapy she is having worsening swelling in her right lower extremity.  Recently saw vascular with negative DVT evaluation.  She is continuing to use conservative measures including elevation, exercise and massage but still has persistent symptoms which are worsening.

## 2023-03-30 NOTE — PHYSICAL EXAM
[FreeTextEntry1] : Gen: Patient is A&O x 3, NAD\par HEENT: EOMI, hearing grossly normal\par Resp: regular, non - labored\par CV: pulses regular\par Skin: no rashes, erythema\par Lymph: +Edema in RLE \par Inspection: no instability \par ROM:  restricted in right knee flexion \par Palpation:no tenderness to palpation\par Sensation: intact to light touch\par Reflexes: 1+ and symmetric throughout\par Strength: 5/5 throughout\par Special tests: +Right stemmers sign\par Gait: antalgic, ambulates with axillary crutches \par \par RLE measurements:\par Mid thigh 65.5 cm\par Knee: 52.5 cm\par Mid Calf: 49 cm\par Ankle: 25 cm\par Mid-foot: 21 cm\par \par

## 2023-03-30 NOTE — ASSESSMENT
Patient called for a refill    Medication: levothyroxine 150 MCG tablet    Pharmacy: [facility] [place]   [FreeTextEntry1] : 57 year old female presenting for evaluation.\par \par #Lymphedema:\par -Lymphedema education provided to patient\par -Continue compression garments\par -Recommend pneumatic compression pump \par -F/U with vascular\par -She has continued to be compliant with conservative therapies for lymphedema including elevation, exercise and compression.  Despite that she still has persistent symptoms of lymphedema which have been worsening since stopping lymphedema therapy.  Recommend pneumatic compression pump to continue to help assist her lymphedema management and to prevent progression of lymphedema.  \par \par #Impaired mobility:\par -Chronic impaired mobility\par -Case discussed with Helene Urbina-PT, she is demonstrating functional gains and no longer requiring rolling walker\par -Continue physical therapy \par -Follow up with orthopedics\par \par Follow up in 6 weeks.

## 2023-04-19 ENCOUNTER — APPOINTMENT (OUTPATIENT)
Dept: ORTHOPEDIC SURGERY | Facility: CLINIC | Age: 58
End: 2023-04-19
Payer: COMMERCIAL

## 2023-04-19 VITALS
WEIGHT: 260 LBS | DIASTOLIC BLOOD PRESSURE: 82 MMHG | HEART RATE: 81 BPM | HEIGHT: 66 IN | SYSTOLIC BLOOD PRESSURE: 171 MMHG | BODY MASS INDEX: 41.78 KG/M2

## 2023-04-19 DIAGNOSIS — S82.141D DISPLACED BICONDYLAR FRACTURE OF RIGHT TIBIA, SUBSEQUENT ENCOUNTER FOR CLOSED FRACTURE WITH ROUTINE HEALING: ICD-10-CM

## 2023-04-19 PROCEDURE — 73560 X-RAY EXAM OF KNEE 1 OR 2: CPT | Mod: RT

## 2023-04-19 PROCEDURE — 99214 OFFICE O/P EST MOD 30 MIN: CPT

## 2023-04-19 NOTE — REVIEW OF SYSTEMS
[Joint Pain] : joint pain [Joint Stiffness] : joint stiffness [Joint Swelling] : joint swelling [Negative] : Heme/Lymph [FreeTextEntry9] : right leg pain

## 2023-04-19 NOTE — PHYSICAL EXAM
[de-identified] : Physical Exam:\par General: Well appearing, no acute distress, A&O\par Neurologic: A&Ox3, No focal deficits\par Head: NCAT without abrasions, lacerations, or ecchymosis to head, face, or scalp\par Respiratory: Equal chest wall expansion bilaterally, no accessory muscle use\par Lymphatic: No lymphadenopathy palpated\par Skin: Warm and dry\par Psychiatric: Normal mood and affect\par \par RLE:\par SILT s/s/sp/dp/t\par Fires EHL/FHL/GS/TA\par brisk capillary refill\par Well-healed surgical scars\par Knee range of motion from 0 to 60 degrees\par Positive tenderness to palpation along the medial joint line and along the patellar tendon\par 2+ edema [de-identified] : 2 view plain films of the right knee were obtained today which show continued healing of the bicondylar tibial plateau fracture with no loss of alignment

## 2023-04-19 NOTE — DISCUSSION/SUMMARY
[de-identified] : The patient is a pleasant 57-year-old female healing well from a tibial plateau but still having significant swelling, lymphedema and stiffness as a result of her injury.  I have no restrictions for her at the present time.  Would recommend continued physical therapy for range of motion, strengthening, gait training, weightbearing as well as lymphedema treatment.  No orthopedic trauma intervention is required but we will help facilitate future care as needed. The patient may follow up as needed.\par \par The patient was given the opportunity to ask questions and all questions were answered to their satisfaction.\par \par Harpreet Gamez MD\par Orthopaedic Trauma Surgeon\par [facility]\par [facility] [place]\par Director Orthopaedic Trauma, [facility]\par \par \par \par

## 2023-04-19 NOTE — HISTORY OF PRESENT ILLNESS
[Bending] : worsened by bending [Lifting] : worsened by lifting [Weight Bearing] : worsened by weight bearing [Recumbency] : relieved by recumbency [Rest] : relieved by rest [de-identified] : The patient is a pleasant 57-year-old female presents for evaluation of right knee pain.  She is status post tibial plateau fracture about a year ago.  She is continuing to have pain.  She is ambulating with a crutches. She is going to physical therapy.  Her postoperative recovery was complicated by a deep venous thrombosis and lower extremity swelling/lymphedema.  She is still following up with hematology and vascular surgery.  The patient states the pain is made worse with activity and relieved with rest.  Aching, 4 out of 10.

## 2023-05-15 ENCOUNTER — APPOINTMENT (OUTPATIENT)
Dept: PHYSICAL MEDICINE AND REHAB | Facility: CLINIC | Age: 58
End: 2023-05-15
Payer: COMMERCIAL

## 2023-05-15 VITALS
HEART RATE: 65 BPM | BODY MASS INDEX: 42.27 KG/M2 | WEIGHT: 263 LBS | HEIGHT: 66 IN | SYSTOLIC BLOOD PRESSURE: 170 MMHG | DIASTOLIC BLOOD PRESSURE: 82 MMHG | RESPIRATION RATE: 14 BRPM

## 2023-05-15 PROCEDURE — 99214 OFFICE O/P EST MOD 30 MIN: CPT

## 2023-05-15 NOTE — ASSESSMENT
[FreeTextEntry1] : 57 year old female presenting for evaluation.\par \par #Lymphedema:\par -Lymphedema education provided to patient\par -Continue compression garments\par -Recommend pneumatic compression pump \par -She has continued to be compliant with conservative therapies for lymphedema including elevation, exercise and compression/complete decongestive therapy.  Despite that she still has persistent symptoms of lymphedema which have been worsening since stopping lymphedema therapy.  She has a lymphedema present in RLE including foot/toes.  Recommend pneumatic compression pump to continue to help assist her lymphedema management and to prevent progression of lymphedema.  \par -Peer-Peer established today to assist with obtaining pneumatic compression pump\par \par #Impaired mobility:\par -Continue physical therapy \par -Continue single point cane\par -Parking permit paperwork competed \par \par Follow up in 2 months.  \par \par I spent a total time of 33 minutes on the date of the encounter evaluating and treating the patient.\par

## 2023-05-15 NOTE — PHYSICAL EXAM
[FreeTextEntry1] : Gen: Patient is A&O x 3, NAD\par HEENT: EOMI, hearing grossly normal\par Resp: regular, non - labored\par CV: pulses regular\par Skin: no rashes, erythema\par Lymph: +Edema in RLE including foot/toes  \par Inspection: no instability \par ROM:  restricted in right knee flexion \par Palpation:no tenderness to palpation\par Sensation: intact to light touch\par Reflexes: 1+ and symmetric throughout\par Strength: 5/5 throughout\par Special tests: +Right stemmers sign\par Gait: antalgic, ambulates with single point cane \par \par RLE measurements:\par Mid thigh 65.5 cm\par Knee: 52.5 cm\par Mid Calf: 49 cm\par Ankle: 25 cm\par Mid-foot: 21 cm\par \par

## 2023-05-15 NOTE — HISTORY OF PRESENT ILLNESS
[FreeTextEntry1] : Ms. Ribeiro is 57 year old F w/ no PMHx S/p open reduction with internal fixation of right bicondylar tibial plateau fracture on 4/25/22 at [facility]. She was in Rehab post surgery Was discharged on 5/24/22. She was on Lovenox 40 mg in Rehab.  She was admitted to E/D on 6/28/22 c/o RLE swelling and pain for 1 week. US Showed RLE DVT on 6/28/22.  Was on xarelto, discontinued in Jan 2023.  \par \par She reports that her mobility is improving.  She is currently ambulating with single-point cane.  Continue to work physical therapy which is helping.\par \par Reports that her lymphedema which has been present since her surgery is still persistent.  She continues to wear compression regularly.  She has previously performed complete decongestive therapy.  Denies any new redness or erythema.  Reports that she does have swelling in her toes which comes and goes.  Denies any unintentional weight loss, night sweats or fever, chills.

## 2023-06-30 ENCOUNTER — APPOINTMENT (OUTPATIENT)
Dept: VASCULAR SURGERY | Facility: CLINIC | Age: 58
End: 2023-06-30
Payer: COMMERCIAL

## 2023-06-30 VITALS
OXYGEN SATURATION: 98 % | SYSTOLIC BLOOD PRESSURE: 165 MMHG | WEIGHT: 263 LBS | HEIGHT: 66 IN | HEART RATE: 64 BPM | DIASTOLIC BLOOD PRESSURE: 80 MMHG | BODY MASS INDEX: 42.27 KG/M2 | TEMPERATURE: 97.3 F

## 2023-06-30 PROCEDURE — 99213 OFFICE O/P EST LOW 20 MIN: CPT

## 2023-06-30 RX ORDER — TRAMADOL HYDROCHLORIDE 50 MG/1
50 TABLET, COATED ORAL
Qty: 20 | Refills: 0 | Status: DISCONTINUED | COMMUNITY
Start: 2022-11-02 | End: 2023-06-30

## 2023-06-30 RX ORDER — TRAMADOL HYDROCHLORIDE 50 MG/1
50 TABLET, COATED ORAL EVERY 8 HOURS
Qty: 20 | Refills: 0 | Status: DISCONTINUED | COMMUNITY
Start: 2022-06-13 | End: 2023-06-30

## 2023-06-30 RX ORDER — RIVAROXABAN 20 MG/1
20 TABLET, FILM COATED ORAL
Qty: 90 | Refills: 3 | Status: DISCONTINUED | COMMUNITY
Start: 2022-07-08 | End: 2023-06-30

## 2023-06-30 NOTE — ADDENDUM
[FreeTextEntry1] : Patient presents with lymphedema secondary to venous insufficiency. The patient has been compliant with conservative therapies including compression 20-30mmhg, exercise and leg elevation at rest for over a month.  Despite these therapies symptoms continue to progress and extend into the truncal region. Early signs of hyperpigmentation noted in both calves. I am now recommending a lymphedema pump vended by [employer].\par

## 2023-06-30 NOTE — HISTORY OF PRESENT ILLNESS
[FreeTextEntry1] : 7/13/22: 55 yo female PMHx HLD, presents for evaluation of RLE DVT. Pt had an ORIF for right tibial fracture 4/25/22. On 6/28/22 she called her ortho provider because she was having RLE swelling. They sent her for a venous duplex and she was diagnosed with a right femoral, popliteal and posterior tibial DVT. She was started on Xarelto 15 mg BID. She is currently on Xarelto 20 mg. She saw hematology and has no clotting disorders. She has been having less leg swelling since starting Xarelto. Her bother has a factor V leiden mutation. \par \par 10/26/22: Pt continues to have severe swelling in her right leg. She has been complaint with Xarelto. She has been using compression stockings. She denies any SOB or chest pain. \par \par 11/2/22: Pt had RLE UNNA boot on for the past week. She feels her edema has decreased. She has been complaint with Xarelto. She got measured for her lymphedema massage pump yesterday. She denies any SOB or chest pain. \par \par 1/4/23: Pt doing okay since last visit. She continues to have edema in her right leg. She feels the edema is decreasing her range of motion. She has been compliant with Xarelto. She has been using compression stockings. She denies any SOB or chest pain. \par \par 3/13/23: Pt returns today with increased RLE edema and burning pain. Pt states last week her mid calf to knee became more swollen. She started to have burning pain in the area, worse at posterior knee. She was going to lymphedema massage in person but stopped a few months ago to start gait training physical therapy. She has been compliant with compression stockings. She stopped Xarelto at last visit and takes aspirin now. \par \par 6/30/23: Pt has been off Xarelto since January, on aspirin. She still has RLE edema and pain from mid thigh to mid calf. She is still doing physical therapy. She is waiting to get her lymphedema massage pump. She has been compliant with 20-30 mmHg thigh high compression stockings.

## 2023-06-30 NOTE — ASSESSMENT
[FreeTextEntry1] : 56 yo female hx of right femoral and popliteal DVT after ORIF in April 2022. No clotting disorders according to hematology. No femoral or popliteal DVT \par \par Pt counseled again on results of duplex and above diagnosis.\par Pt counseled to continue using compression stockings on legs bilaterally \par Pt counseled to resume activity. Pt counseled on walking for exercise\par Continue Aspirn 81 mg \par RTC in 6 months for repeat LE venous duplex \par \par A total of 20 minutes was spent with patient and coordinating care\par \par

## 2023-07-17 ENCOUNTER — APPOINTMENT (OUTPATIENT)
Dept: PHYSICAL MEDICINE AND REHAB | Facility: CLINIC | Age: 58
End: 2023-07-17
Payer: COMMERCIAL

## 2023-07-17 VITALS
SYSTOLIC BLOOD PRESSURE: 163 MMHG | BODY MASS INDEX: 42.75 KG/M2 | HEIGHT: 66 IN | HEART RATE: 72 BPM | DIASTOLIC BLOOD PRESSURE: 83 MMHG | WEIGHT: 266 LBS | RESPIRATION RATE: 12 BRPM

## 2023-07-17 PROCEDURE — 99214 OFFICE O/P EST MOD 30 MIN: CPT

## 2023-07-17 NOTE — ASSESSMENT
[FreeTextEntry1] : 57 year old female presenting for evaluation.\par \par #Lymphedema:\par -Lymphedema education provided to patient\par -Continue compression garments\par -She has continued to be compliant with conservative therapies for lymphedema including elevation, exercise and compression/complete decongestive therapy.  Despite that she still has persistent symptoms of lymphedema. She has a lymphedema present in RLE.  Recommend pneumatic compression pump to continue to help assist her lymphedema management and to prevent progression of lymphedema.  \par \par #Impaired mobility:\par -Continue physical therapy, case discussed with Maikol Gonzáles-MUKUND, focus on home exercise program \par -Continue single point cane\par \par Follow up in 2-3 months.  \par \par

## 2023-07-17 NOTE — HISTORY OF PRESENT ILLNESS
[FreeTextEntry1] : Ms. Ribeiro is 57 year old F w/ no PMHx S/p open reduction with internal fixation of right bicondylar tibial plateau fracture on 4/25/22 at [facility]. She was in Rehab post surgery Was discharged on 5/24/22. She was on Lovenox 40 mg in Rehab.  She was admitted to E/D on 6/28/22 c/o RLE swelling and pain for 1 week. US Showed RLE DVT on 6/28/22.  Was on xarelto, discontinued in Jan 2023.  \par \par She reports that her mobility is improving.  She is currently ambulating with single-point cane.\par Continues to go to PT, with DERIAN Lo. Wearing compression daily and sleeping in hospital bed with feet elevated.\par Continues with self MLD and compression stocking.  Denies any worsening of edema.  Denies any new erythema or increased warmth.

## 2023-07-17 NOTE — PHYSICAL EXAM
[FreeTextEntry1] : Gen: Patient is A&O x 3, NAD\par HEENT: EOMI, hearing grossly normal\par Resp: regular, non - labored\par CV: pulses regular\par Skin: no rashes, erythema\par Lymph: + pitting Edema in RLE  \par Inspection: no instability \par ROM: restricted in right knee flexion \par Palpation: no tenderness to palpation\par Sensation: intact to light touch\par Strength: 5/5 throughout\par Gait: antalgic, ambulates with single point cane \par \par

## 2023-09-22 ENCOUNTER — APPOINTMENT (OUTPATIENT)
Dept: VASCULAR SURGERY | Facility: CLINIC | Age: 58
End: 2023-09-22
Payer: COMMERCIAL

## 2023-09-22 VITALS
DIASTOLIC BLOOD PRESSURE: 88 MMHG | WEIGHT: 266 LBS | BODY MASS INDEX: 42.75 KG/M2 | HEIGHT: 66 IN | SYSTOLIC BLOOD PRESSURE: 158 MMHG

## 2023-09-22 PROCEDURE — 99213 OFFICE O/P EST LOW 20 MIN: CPT

## 2023-09-22 RX ORDER — ASPIRIN 81 MG/1
81 TABLET ORAL
Refills: 0 | Status: ACTIVE | COMMUNITY

## 2023-10-11 ENCOUNTER — APPOINTMENT (OUTPATIENT)
Dept: VASCULAR SURGERY | Facility: CLINIC | Age: 58
End: 2023-10-11
Payer: COMMERCIAL

## 2023-10-11 VITALS
DIASTOLIC BLOOD PRESSURE: 78 MMHG | SYSTOLIC BLOOD PRESSURE: 122 MMHG | TEMPERATURE: 97.6 F | OXYGEN SATURATION: 97 % | HEIGHT: 66 IN | WEIGHT: 266 LBS | HEART RATE: 82 BPM | RESPIRATION RATE: 14 BRPM | BODY MASS INDEX: 42.75 KG/M2

## 2023-10-11 PROCEDURE — 99213 OFFICE O/P EST LOW 20 MIN: CPT

## 2023-10-11 PROCEDURE — 93971 EXTREMITY STUDY: CPT | Mod: RT

## 2023-10-13 ENCOUNTER — APPOINTMENT (OUTPATIENT)
Dept: PHYSICAL MEDICINE AND REHAB | Facility: CLINIC | Age: 58
End: 2023-10-13
Payer: COMMERCIAL

## 2023-10-13 VITALS
DIASTOLIC BLOOD PRESSURE: 79 MMHG | RESPIRATION RATE: 12 BRPM | HEART RATE: 67 BPM | SYSTOLIC BLOOD PRESSURE: 149 MMHG | BODY MASS INDEX: 42.75 KG/M2 | WEIGHT: 266 LBS | HEIGHT: 66 IN

## 2023-10-13 DIAGNOSIS — I89.0 LYMPHEDEMA, NOT ELSEWHERE CLASSIFIED: ICD-10-CM

## 2023-10-13 PROCEDURE — 99213 OFFICE O/P EST LOW 20 MIN: CPT

## 2024-01-15 NOTE — ED ADULT TRIAGE NOTE - HAVE YOU HAD A SECOND COVID-19 BOOSTER?
Advised to call Ortho clinic as patient stated she has previously had these done through the ortho clinic at [place]. Patient PCP did an injection during one appointment, but patient would like to continue these injections through the ortho clinic. Writer gave referral number to patient and advised to call back if any complications.    Cheli LEYVA RN  [facility]  [phone]    No

## 2024-02-23 ENCOUNTER — APPOINTMENT (OUTPATIENT)
Dept: PHYSICAL MEDICINE AND REHAB | Facility: CLINIC | Age: 59
End: 2024-02-23
Payer: COMMERCIAL

## 2024-02-23 DIAGNOSIS — M25.561 PAIN IN RIGHT KNEE: ICD-10-CM

## 2024-02-23 DIAGNOSIS — R26.89 OTHER ABNORMALITIES OF GAIT AND MOBILITY: ICD-10-CM

## 2024-02-23 DIAGNOSIS — I89.0 LYMPHEDEMA, NOT ELSEWHERE CLASSIFIED: ICD-10-CM

## 2024-02-23 PROCEDURE — 99214 OFFICE O/P EST MOD 30 MIN: CPT

## 2024-02-23 RX ORDER — MELOXICAM 7.5 MG/1
7.5 TABLET ORAL
Qty: 30 | Refills: 1 | Status: ACTIVE | COMMUNITY
Start: 2024-02-23 | End: 1900-01-01

## 2024-02-23 NOTE — ASSESSMENT
[FreeTextEntry1] : 58 year old female presenting for evaluation.  #Lymphedema/B/L LE edema R>L: -Lymphedema education provided to patient -Continue compression stocking-rx given for new garments 20-30mmHg  -Continue pneumatic compression pump  #Impaired mobility: -Start PT  #Chronic knee pain: -Start meloxicam 7.5mg daily prn with food, advised on prolonged risks of NSAID use   Follow up in 3-4 months.

## 2024-02-23 NOTE — HISTORY OF PRESENT ILLNESS
[FreeTextEntry1] : Ms. Ribeiro is 58 year old F w/ no PMHx S/p open reduction with internal fixation of right bicondylar tibial plateau fracture on 4/25/22 at [facility]. She was in Rehab post surgery Was discharged on 5/24/22. She was on Lovenox 40 mg in Rehab.  She was admitted to E/D on 6/28/22 c/o RLE swelling and pain for 1 week. US Showed RLE DVT on 6/28/22.  Was on xarelto, discontinued in Jan 2023.    Since her last visit she reports lymphedema has been stable.  Continues with compression stockings pneumatic compression pump.  Denies any erythema or increased warmth.  Still having difficulty with mobility but improving.  Using single-point cane.  Still has some knee pain taking ibuprofen as needed.

## 2024-02-23 NOTE — PHYSICAL EXAM
[FreeTextEntry1] : Gen: Patient is A&O x 3, NAD HEENT: EOMI, hearing grossly normal Resp: regular, non - labored CV: pulses regular Skin: no rashes, erythema Lymph: + pitting Edema in RLE   Inspection: no instability  ROM: restricted in right knee flexion  Palpation: no tenderness to palpation Sensation: intact to light touch Strength: 5/5 throughout Gait: antalgic, ambulates with single point cane

## 2024-04-10 ENCOUNTER — APPOINTMENT (OUTPATIENT)
Dept: VASCULAR SURGERY | Facility: CLINIC | Age: 59
End: 2024-04-10
Payer: COMMERCIAL

## 2024-04-10 VITALS
SYSTOLIC BLOOD PRESSURE: 128 MMHG | OXYGEN SATURATION: 97 % | RESPIRATION RATE: 14 BRPM | HEIGHT: 66 IN | HEART RATE: 71 BPM | DIASTOLIC BLOOD PRESSURE: 86 MMHG | WEIGHT: 275 LBS | BODY MASS INDEX: 44.2 KG/M2

## 2024-04-10 DIAGNOSIS — I82.409 ACUTE EMBOLISM AND THROMBOSIS OF UNSPECIFIED DEEP VEINS OF UNSPECIFIED LOWER EXTREMITY: ICD-10-CM

## 2024-04-10 PROCEDURE — 99213 OFFICE O/P EST LOW 20 MIN: CPT

## 2024-04-10 PROCEDURE — 93971 EXTREMITY STUDY: CPT

## 2024-04-10 RX ORDER — PANTOPRAZOLE SODIUM 40 MG/1
40 GRANULE, DELAYED RELEASE ORAL
Refills: 0 | Status: DISCONTINUED | COMMUNITY
End: 2024-04-10

## 2024-04-10 RX ORDER — TRAMADOL HYDROCHLORIDE 50 MG/1
50 TABLET, COATED ORAL
Qty: 20 | Refills: 0 | Status: DISCONTINUED | COMMUNITY
Start: 2023-01-04 | End: 2024-04-10

## 2024-04-10 NOTE — PHYSICAL EXAM
[2+] : left 2+ [Ankle Swelling (On Exam)] : present [Ankle Swelling On The Right] : mild [Alert] : alert [Oriented to Person] : oriented to person [Oriented to Place] : oriented to place [Oriented to Time] : oriented to time [Calm] : calm [Varicose Veins Of Lower Extremities] : not present [] : not present [Skin Ulcer] : no ulcer [de-identified] : NAD. well appearing  [FreeTextEntry1] : PT non-palpable due to edema

## 2024-04-10 NOTE — ASSESSMENT
[FreeTextEntry1] : 57 yo female hx of right femoral and popliteal DVT after ORIF in April 2022. No clotting disorders according to hematology. Pt continues to have RLE edema and pain, no DVT in RLE on duplex today.   Pt counseled again on results of prior duplex and above diagnosis. Pt counseled to continue using compression stockings on legs bilaterally Continue to lymphedema massage pump  Pt counseled to resume activity. Pt counseled on walking for exercise Continue Aspirn 81 mg RTC in 1 year for RLE venous duplex   A total of 20 minutes was spent with patient and coordinating care.

## 2024-04-10 NOTE — PROCEDURE
[FreeTextEntry1] : Studies:   6/28/22 RLE venous duplex: femoral vein, popliteal vein and gastroc vein DVT   7/13/22 RLE venous duplex: subacute popliteal and gastroc vein occlusive DVT. posterior tibial subacute non-occlusive DVT.   10/26/22 RLE venous duplex: unable to assess for DVT due to poor image quality   1/9/23 RLE venous duplex: No DVT or insufficiency   3/13/23 RLE venous duplex: No acute DVT in femoral or popliteal vein. Unable to assess for chronic wall thickness due to poor image quality.   10/11/23 RLE venous duplex: No DVT  4/10/24 RLE venous duplex: No DVT

## 2024-04-10 NOTE — HISTORY OF PRESENT ILLNESS
[FreeTextEntry1] : 7/13/22: 55 yo female PMHx HLD, presents for evaluation of RLE DVT. Pt had an ORIF for right tibial fracture 4/25/22. On 6/28/22 she called her ortho provider because she was having RLE swelling. They sent her for a venous duplex and she was diagnosed with a right femoral, popliteal and posterior tibial DVT. She was started on Xarelto 15 mg BID. She is currently on Xarelto 20 mg. She saw hematology and has no clotting disorders. She has been having less leg swelling since starting Xarelto. Her bother has a factor V leiden mutation.   10/26/22: Pt continues to have severe swelling in her right leg. She has been complaint with Xarelto. She has been using compression stockings. She denies any SOB or chest pain.   11/2/22: Pt had RLE UNNA boot on for the past week. She feels her edema has decreased. She has been complaint with Xarelto. She got measured for her lymphedema massage pump yesterday. She denies any SOB or chest pain.   1/4/23: Pt doing okay since last visit. She continues to have edema in her right leg. She feels the edema is decreasing her range of motion. She has been compliant with Xarelto. She has been using compression stockings. She denies any SOB or chest pain.   3/13/23: Pt returns today with increased RLE edema and burning pain. Pt states last week her mid calf to knee became more swollen. She started to have burning pain in the area, worse at posterior knee. She was going to lymphedema massage in person but stopped a few months ago to start gait training physical therapy. She has been compliant with compression stockings. She stopped Xarelto at last visit and takes aspirin now.   6/30/23: Pt has been off Xarelto since January, on aspirin. She still has RLE edema and pain from mid thigh to mid calf. She is still doing physical therapy. She is waiting to get her lymphedema massage pump. She has been compliant with 20-30 mmHg thigh high compression stockings.   9/22/23: She still has RLE edema and pain from mid thigh to mid calf. She is still doing physical therapy.  She has been compliant with 20-30 mmHg thigh high compression stockings.   10/11/23: Pt is here today for RLE venous duplex. A few weeks ago she has having some right posterior calf pain that felt similar to when she had a DVT. She feels the pain has been improving since then. She has been using the lymphedema massage pump and feels she has less edema and has more sensation in her leg.   4/10/24: Pt doing okay since last visit. Continues to have right leg swelling and stiffness in the knee. She has been compliant with lymphedema massage pump and compression stockings. She is going to restart physical therapy soon. She is compliant with aspirin.

## 2024-04-10 NOTE — ADDENDUM
[FreeTextEntry1] : Patient presents with lymphedema secondary to venous insufficiency. The patient has been compliant with conservative therapies including compression 20-30mmhg, exercise and leg elevation at rest for over a month.  Despite these therapies symptoms continue to progress and extend into the truncal region. Early signs of hyperpigmentation noted in both calves. I am now recommending a lymphedema pump vended by [employer].\par

## 2024-07-18 ENCOUNTER — APPOINTMENT (OUTPATIENT)
Dept: PHYSICAL MEDICINE AND REHAB | Facility: CLINIC | Age: 59
End: 2024-07-18
Payer: COMMERCIAL

## 2024-07-18 VITALS
WEIGHT: 280 LBS | HEIGHT: 66 IN | BODY MASS INDEX: 45 KG/M2 | SYSTOLIC BLOOD PRESSURE: 167 MMHG | HEART RATE: 69 BPM | RESPIRATION RATE: 14 BRPM | DIASTOLIC BLOOD PRESSURE: 84 MMHG

## 2024-07-18 DIAGNOSIS — M25.561 PAIN IN RIGHT KNEE: ICD-10-CM

## 2024-07-18 DIAGNOSIS — R60.9 EDEMA, UNSPECIFIED: ICD-10-CM

## 2024-07-18 DIAGNOSIS — R26.89 OTHER ABNORMALITIES OF GAIT AND MOBILITY: ICD-10-CM

## 2024-07-18 DIAGNOSIS — I89.0 LYMPHEDEMA, NOT ELSEWHERE CLASSIFIED: ICD-10-CM

## 2024-07-18 PROCEDURE — 99214 OFFICE O/P EST MOD 30 MIN: CPT

## 2024-07-18 RX ORDER — MELOXICAM 15 MG/1
15 TABLET ORAL
Qty: 30 | Refills: 2 | Status: ACTIVE | COMMUNITY
Start: 2024-07-18 | End: 1900-01-01

## 2024-10-22 ENCOUNTER — APPOINTMENT (OUTPATIENT)
Dept: PHYSICAL MEDICINE AND REHAB | Facility: CLINIC | Age: 59
End: 2024-10-22

## 2024-10-25 NOTE — PHYSICAL EXAM
[facility]    Patient is currently receiving SN home care services from [facility].  and home health team have been notified of patients admission. [facility] liaison will continue to follow patient during hospital stay. If appropriate, provide home care orders to resume services at the time of discharge. If patients discharge date changes, please reach out to clinical liaison or the HUB to ensure SOC/ANDRES date is still appropriate for a safe discharge plan.     Thank you,     FESTUS Garcia, LPN  Home Care Liaison   Cell: [phone]     [2+] : left 2+ [Ankle Swelling (On Exam)] : present [Ankle Swelling On The Right] : mild [Alert] : alert [Oriented to Person] : oriented to person [Oriented to Place] : oriented to place [Oriented to Time] : oriented to time [Calm] : calm [Varicose Veins Of Lower Extremities] : not present [] : not present [Skin Ulcer] : no ulcer [de-identified] : NAD. well appearing  [FreeTextEntry1] : PT non-palpable due to edema

## 2024-10-31 ENCOUNTER — APPOINTMENT (OUTPATIENT)
Dept: PHYSICAL MEDICINE AND REHAB | Facility: CLINIC | Age: 59
End: 2024-10-31
Payer: COMMERCIAL

## 2024-10-31 VITALS
SYSTOLIC BLOOD PRESSURE: 164 MMHG | HEART RATE: 77 BPM | HEIGHT: 66 IN | RESPIRATION RATE: 14 BRPM | DIASTOLIC BLOOD PRESSURE: 87 MMHG | WEIGHT: 275 LBS | BODY MASS INDEX: 44.2 KG/M2

## 2024-10-31 DIAGNOSIS — M25.561 PAIN IN RIGHT KNEE: ICD-10-CM

## 2024-10-31 DIAGNOSIS — R26.89 OTHER ABNORMALITIES OF GAIT AND MOBILITY: ICD-10-CM

## 2024-10-31 DIAGNOSIS — I89.0 LYMPHEDEMA, NOT ELSEWHERE CLASSIFIED: ICD-10-CM

## 2024-10-31 PROCEDURE — 99214 OFFICE O/P EST MOD 30 MIN: CPT

## 2025-02-06 ENCOUNTER — APPOINTMENT (OUTPATIENT)
Dept: PHYSICAL MEDICINE AND REHAB | Facility: CLINIC | Age: 60
End: 2025-02-06
Payer: COMMERCIAL

## 2025-02-06 VITALS
DIASTOLIC BLOOD PRESSURE: 72 MMHG | HEIGHT: 66 IN | BODY MASS INDEX: 45 KG/M2 | RESPIRATION RATE: 14 BRPM | WEIGHT: 280 LBS | SYSTOLIC BLOOD PRESSURE: 142 MMHG | HEART RATE: 83 BPM

## 2025-02-06 DIAGNOSIS — I89.0 LYMPHEDEMA, NOT ELSEWHERE CLASSIFIED: ICD-10-CM

## 2025-02-06 DIAGNOSIS — M79.2 NEURALGIA AND NEURITIS, UNSPECIFIED: ICD-10-CM

## 2025-02-06 DIAGNOSIS — R26.89 OTHER ABNORMALITIES OF GAIT AND MOBILITY: ICD-10-CM

## 2025-02-06 PROCEDURE — 99214 OFFICE O/P EST MOD 30 MIN: CPT

## 2025-02-06 RX ORDER — DULOXETINE HYDROCHLORIDE 20 MG/1
20 CAPSULE, DELAYED RELEASE PELLETS ORAL
Qty: 30 | Refills: 1 | Status: ACTIVE | COMMUNITY
Start: 2025-02-06 | End: 1900-01-01

## 2025-03-13 ENCOUNTER — APPOINTMENT (OUTPATIENT)
Dept: PHYSICAL MEDICINE AND REHAB | Facility: CLINIC | Age: 60
End: 2025-03-13
Payer: COMMERCIAL

## 2025-03-13 VITALS
DIASTOLIC BLOOD PRESSURE: 69 MMHG | HEART RATE: 85 BPM | RESPIRATION RATE: 14 BRPM | SYSTOLIC BLOOD PRESSURE: 122 MMHG | HEIGHT: 66 IN | WEIGHT: 277 LBS | BODY MASS INDEX: 44.52 KG/M2

## 2025-03-13 DIAGNOSIS — I89.0 LYMPHEDEMA, NOT ELSEWHERE CLASSIFIED: ICD-10-CM

## 2025-03-13 DIAGNOSIS — M79.2 NEURALGIA AND NEURITIS, UNSPECIFIED: ICD-10-CM

## 2025-03-13 DIAGNOSIS — R26.89 OTHER ABNORMALITIES OF GAIT AND MOBILITY: ICD-10-CM

## 2025-03-13 PROCEDURE — 99214 OFFICE O/P EST MOD 30 MIN: CPT

## 2025-04-09 ENCOUNTER — APPOINTMENT (OUTPATIENT)
Dept: VASCULAR SURGERY | Facility: CLINIC | Age: 60
End: 2025-04-09
Payer: COMMERCIAL

## 2025-04-09 VITALS — SYSTOLIC BLOOD PRESSURE: 120 MMHG | BODY MASS INDEX: 44.71 KG/M2 | WEIGHT: 277 LBS | DIASTOLIC BLOOD PRESSURE: 78 MMHG

## 2025-04-09 DIAGNOSIS — I82.409 ACUTE EMBOLISM AND THROMBOSIS OF UNSPECIFIED DEEP VEINS OF UNSPECIFIED LOWER EXTREMITY: ICD-10-CM

## 2025-04-09 PROCEDURE — 99213 OFFICE O/P EST LOW 20 MIN: CPT

## 2025-04-09 PROCEDURE — 93971 EXTREMITY STUDY: CPT | Mod: RT

## 2025-04-09 PROCEDURE — 93975 VASCULAR STUDY: CPT

## 2025-05-13 ENCOUNTER — APPOINTMENT (OUTPATIENT)
Dept: PHYSICAL MEDICINE AND REHAB | Facility: CLINIC | Age: 60
End: 2025-05-13
Payer: COMMERCIAL

## 2025-05-13 VITALS
SYSTOLIC BLOOD PRESSURE: 123 MMHG | DIASTOLIC BLOOD PRESSURE: 80 MMHG | HEIGHT: 66 IN | BODY MASS INDEX: 43.71 KG/M2 | HEART RATE: 74 BPM | WEIGHT: 272 LBS | RESPIRATION RATE: 14 BRPM

## 2025-05-13 DIAGNOSIS — R26.89 OTHER ABNORMALITIES OF GAIT AND MOBILITY: ICD-10-CM

## 2025-05-13 DIAGNOSIS — M79.2 NEURALGIA AND NEURITIS, UNSPECIFIED: ICD-10-CM

## 2025-05-13 DIAGNOSIS — I89.0 LYMPHEDEMA, NOT ELSEWHERE CLASSIFIED: ICD-10-CM

## 2025-05-13 PROCEDURE — 99214 OFFICE O/P EST MOD 30 MIN: CPT

## 2025-05-13 RX ORDER — PREGABALIN 50 MG/1
50 CAPSULE ORAL
Qty: 60 | Refills: 1 | Status: ACTIVE | COMMUNITY
Start: 2025-05-13 | End: 1900-01-01

## 2025-06-26 ENCOUNTER — APPOINTMENT (OUTPATIENT)
Dept: PHYSICAL MEDICINE AND REHAB | Facility: CLINIC | Age: 60
End: 2025-06-26
Payer: COMMERCIAL

## 2025-06-26 VITALS
HEIGHT: 66 IN | BODY MASS INDEX: 45 KG/M2 | SYSTOLIC BLOOD PRESSURE: 126 MMHG | WEIGHT: 280 LBS | HEART RATE: 62 BPM | RESPIRATION RATE: 12 BRPM | DIASTOLIC BLOOD PRESSURE: 77 MMHG

## 2025-06-26 PROCEDURE — 99214 OFFICE O/P EST MOD 30 MIN: CPT

## 2025-07-31 ENCOUNTER — APPOINTMENT (OUTPATIENT)
Dept: PHYSICAL MEDICINE AND REHAB | Facility: CLINIC | Age: 60
End: 2025-07-31
Payer: COMMERCIAL

## 2025-07-31 VITALS
HEART RATE: 71 BPM | DIASTOLIC BLOOD PRESSURE: 68 MMHG | BODY MASS INDEX: 28.12 KG/M2 | WEIGHT: 175 LBS | SYSTOLIC BLOOD PRESSURE: 110 MMHG | HEIGHT: 66 IN

## 2025-07-31 DIAGNOSIS — R26.89 OTHER ABNORMALITIES OF GAIT AND MOBILITY: ICD-10-CM

## 2025-07-31 DIAGNOSIS — M25.561 PAIN IN RIGHT KNEE: ICD-10-CM

## 2025-07-31 DIAGNOSIS — M79.2 NEURALGIA AND NEURITIS, UNSPECIFIED: ICD-10-CM

## 2025-07-31 DIAGNOSIS — I89.0 LYMPHEDEMA, NOT ELSEWHERE CLASSIFIED: ICD-10-CM

## 2025-07-31 PROCEDURE — 99214 OFFICE O/P EST MOD 30 MIN: CPT

## (undated) DEVICE — SOL IRR POUR H2O 1000ML

## (undated) DEVICE — DRILL BIT SYNTHES ORTHO CALIBRATED 2.8MM

## (undated) DEVICE — DRAPE U LONG 47X70"

## (undated) DEVICE — BLANKET WARMER UPPER ADULT

## (undated) DEVICE — DRSG COBAN 6"

## (undated) DEVICE — NDL HYPO REGULAR BEVEL 25G X 1.5"

## (undated) DEVICE — DRAPE IOBAN 23X33"

## (undated) DEVICE — GLV 7.5 PROTEXIS

## (undated) DEVICE — NDL 1/2 TAPER MAYO FREE NEEDLES .0.050X1.102"

## (undated) DEVICE — WRAP COMPRESSION CALF MED

## (undated) DEVICE — PACK EXTREMITY SOUTHSIDE

## (undated) DEVICE — DRILL BIT SYNTHES ORTHO QC 2.5X110MM

## (undated) DEVICE — SPONGE GAUZE 12 PLY 4 X 8

## (undated) DEVICE — DRAPE C ARMOUR

## (undated) DEVICE — DRSG ACE BANDAGE 6"

## (undated) DEVICE — ELCTR GROUNDING PAD ADULT COVIDIEN

## (undated) DEVICE — FRAZIER SUCTION TIP 10FR

## (undated) DEVICE — DRAPE TOWEL BLUE 17" X 24"

## (undated) DEVICE — DRAPE HALF SHEET 40X57"

## (undated) DEVICE — SYR LUER LOK 10CC

## (undated) DEVICE — SUT MONOCRYL 3-0 27" PS-2 UNDYED

## (undated) DEVICE — DRSG CAST PLASTER 4"

## (undated) DEVICE — SOL IRR POUR NS 0.9% 1000ML

## (undated) DEVICE — DRAPE C ARM UNIVERSAL

## (undated) DEVICE — GLV 8 ESTEEM BLUE

## (undated) DEVICE — SPONGE LAP PAD W RING 18X18"

## (undated) DEVICE — DRILL BIT SYNTHES ORTHO QUICK COUPLING 2.8X165MM

## (undated) DEVICE — SUT VICRYL 0 27" CP-1 UNDYED

## (undated) DEVICE — SUT MONOCRYL 2-0 27" SH UNDYED

## (undated) DEVICE — DRAPE EXTREMITY 87X106X128"

## (undated) DEVICE — DRAPE MAYO STAND 23"

## (undated) DEVICE — DRSG ESMARK 6"